# Patient Record
Sex: FEMALE | Race: WHITE | Employment: FULL TIME | URBAN - METROPOLITAN AREA
[De-identification: names, ages, dates, MRNs, and addresses within clinical notes are randomized per-mention and may not be internally consistent; named-entity substitution may affect disease eponyms.]

---

## 2019-06-06 ENCOUNTER — HOSPITAL ENCOUNTER (OUTPATIENT)
Dept: LAB | Age: 59
Discharge: HOME | End: 2019-06-06

## 2019-06-06 PROCEDURE — 86706 HEP B SURFACE ANTIBODY: CPT

## 2019-06-06 PROCEDURE — 86735 MUMPS ANTIBODY: CPT

## 2019-06-06 PROCEDURE — 86765 RUBEOLA ANTIBODY: CPT

## 2019-06-06 PROCEDURE — 86762 RUBELLA ANTIBODY: CPT

## 2019-06-06 PROCEDURE — 86787 VARICELLA-ZOSTER ANTIBODY: CPT

## 2019-06-07 LAB
HBV SURFACE AB SER QL IA: POSITIVE
HBV SURFACE AB SERPL IA-ACNC: 14.55 MIU/ML
HEP BS AB COMMENT,HBSAC: NORMAL
RUBV IGG SER-IMP: NORMAL

## 2019-06-08 LAB
MEV IGG SER IA-ACNC: >300 AU/ML
MUV IGG SER IA-ACNC: 245 AU/ML
VZV IGG SER IA-ACNC: 2251 INDEX

## 2020-01-27 ENCOUNTER — APPOINTMENT (OUTPATIENT)
Dept: CT IMAGING | Age: 60
End: 2020-01-27
Attending: NURSE PRACTITIONER
Payer: COMMERCIAL

## 2020-01-27 ENCOUNTER — APPOINTMENT (OUTPATIENT)
Dept: GENERAL RADIOLOGY | Age: 60
End: 2020-01-27
Attending: NURSE PRACTITIONER
Payer: COMMERCIAL

## 2020-01-27 ENCOUNTER — HOSPITAL ENCOUNTER (EMERGENCY)
Age: 60
Discharge: HOME | End: 2020-01-27
Attending: EMERGENCY MEDICINE
Payer: COMMERCIAL

## 2020-01-27 ENCOUNTER — NURSE TRIAGE (OUTPATIENT)
Dept: OTHER | Facility: CLINIC | Age: 60
End: 2020-01-27

## 2020-01-27 VITALS
HEIGHT: 64 IN | RESPIRATION RATE: 18 BRPM | DIASTOLIC BLOOD PRESSURE: 73 MMHG | TEMPERATURE: 97.9 F | WEIGHT: 157 LBS | BODY MASS INDEX: 26.8 KG/M2 | SYSTOLIC BLOOD PRESSURE: 104 MMHG | HEART RATE: 86 BPM | OXYGEN SATURATION: 98 %

## 2020-01-27 DIAGNOSIS — R42 LIGHTHEADEDNESS: ICD-10-CM

## 2020-01-27 DIAGNOSIS — R11.0 NAUSEA WITHOUT VOMITING: ICD-10-CM

## 2020-01-27 DIAGNOSIS — R10.13 ABDOMINAL PAIN, EPIGASTRIC: Primary | ICD-10-CM

## 2020-01-27 LAB
ALBUMIN SERPL-MCNC: 4 G/DL (ref 3.4–5)
ALBUMIN/GLOB SERPL: 1.1 {RATIO} (ref 0.8–1.7)
ALP SERPL-CCNC: 67 U/L (ref 45–117)
ALT SERPL-CCNC: 28 U/L (ref 13–56)
ANION GAP SERPL CALC-SCNC: 4 MMOL/L (ref 3–18)
APPEARANCE UR: CLEAR
AST SERPL-CCNC: 30 U/L (ref 10–38)
ATRIAL RATE: 58 BPM
ATRIAL RATE: 75 BPM
BACTERIA URNS QL MICRO: NEGATIVE /HPF
BASOPHILS # BLD: 0 K/UL (ref 0–0.1)
BASOPHILS NFR BLD: 1 % (ref 0–2)
BILIRUB SERPL-MCNC: 0.3 MG/DL (ref 0.2–1)
BILIRUB UR QL: NEGATIVE
BUN SERPL-MCNC: 22 MG/DL (ref 7–18)
BUN/CREAT SERPL: 19 (ref 12–20)
CALCIUM SERPL-MCNC: 9.1 MG/DL (ref 8.5–10.1)
CALCULATED P AXIS, ECG09: 45 DEGREES
CALCULATED P AXIS, ECG09: 47 DEGREES
CALCULATED R AXIS, ECG10: -2 DEGREES
CALCULATED R AXIS, ECG10: -6 DEGREES
CALCULATED T AXIS, ECG11: -13 DEGREES
CALCULATED T AXIS, ECG11: 54 DEGREES
CHLORIDE SERPL-SCNC: 108 MMOL/L (ref 100–111)
CK MB CFR SERPL CALC: 1.3 % (ref 0–4)
CK MB CFR SERPL CALC: 2 % (ref 0–4)
CK MB SERPL-MCNC: 1.2 NG/ML (ref 5–25)
CK MB SERPL-MCNC: 1.2 NG/ML (ref 5–25)
CK SERPL-CCNC: 59 U/L (ref 26–192)
CK SERPL-CCNC: 96 U/L (ref 26–192)
CO2 SERPL-SCNC: 27 MMOL/L (ref 21–32)
COLOR UR: YELLOW
CREAT SERPL-MCNC: 1.15 MG/DL (ref 0.6–1.3)
DIAGNOSIS, 93000: NORMAL
DIAGNOSIS, 93000: NORMAL
DIFFERENTIAL METHOD BLD: ABNORMAL
EOSINOPHIL # BLD: 0.3 K/UL (ref 0–0.4)
EOSINOPHIL NFR BLD: 4 % (ref 0–5)
EPITH CASTS URNS QL MICRO: NORMAL /LPF (ref 0–5)
ERYTHROCYTE [DISTWIDTH] IN BLOOD BY AUTOMATED COUNT: 14.6 % (ref 11.6–14.5)
GLOBULIN SER CALC-MCNC: 3.7 G/DL (ref 2–4)
GLUCOSE BLD STRIP.AUTO-MCNC: 155 MG/DL (ref 70–110)
GLUCOSE SERPL-MCNC: 179 MG/DL (ref 74–99)
GLUCOSE UR STRIP.AUTO-MCNC: >1000 MG/DL
HCT VFR BLD AUTO: 43.6 % (ref 35–45)
HGB BLD-MCNC: 13.9 G/DL (ref 12–16)
HGB UR QL STRIP: ABNORMAL
KETONES UR QL STRIP.AUTO: NEGATIVE MG/DL
LEUKOCYTE ESTERASE UR QL STRIP.AUTO: NEGATIVE
LIPASE SERPL-CCNC: 136 U/L (ref 73–393)
LYMPHOCYTES # BLD: 2 K/UL (ref 0.9–3.6)
LYMPHOCYTES NFR BLD: 32 % (ref 21–52)
MAGNESIUM SERPL-MCNC: 2 MG/DL (ref 1.6–2.6)
MCH RBC QN AUTO: 25.9 PG (ref 24–34)
MCHC RBC AUTO-ENTMCNC: 31.9 G/DL (ref 31–37)
MCV RBC AUTO: 81.2 FL (ref 74–97)
MONOCYTES # BLD: 0.4 K/UL (ref 0.05–1.2)
MONOCYTES NFR BLD: 7 % (ref 3–10)
NEUTS SEG # BLD: 3.6 K/UL (ref 1.8–8)
NEUTS SEG NFR BLD: 56 % (ref 40–73)
NITRITE UR QL STRIP.AUTO: NEGATIVE
P-R INTERVAL, ECG05: 120 MS
P-R INTERVAL, ECG05: 134 MS
PH UR STRIP: 5 [PH] (ref 5–8)
PLATELET # BLD AUTO: 411 K/UL (ref 135–420)
PMV BLD AUTO: 11 FL (ref 9.2–11.8)
POTASSIUM SERPL-SCNC: 4.7 MMOL/L (ref 3.5–5.5)
PROT SERPL-MCNC: 7.7 G/DL (ref 6.4–8.2)
PROT UR STRIP-MCNC: NEGATIVE MG/DL
Q-T INTERVAL, ECG07: 424 MS
Q-T INTERVAL, ECG07: 442 MS
QRS DURATION, ECG06: 78 MS
QRS DURATION, ECG06: 92 MS
QTC CALCULATION (BEZET), ECG08: 433 MS
QTC CALCULATION (BEZET), ECG08: 473 MS
RBC # BLD AUTO: 5.37 M/UL (ref 4.2–5.3)
RBC #/AREA URNS HPF: NEGATIVE /HPF (ref 0–5)
SODIUM SERPL-SCNC: 139 MMOL/L (ref 136–145)
SP GR UR REFRACTOMETRY: >1.03 (ref 1–1.03)
TROPONIN I SERPL-MCNC: <0.02 NG/ML (ref 0–0.04)
TROPONIN I SERPL-MCNC: <0.02 NG/ML (ref 0–0.04)
UROBILINOGEN UR QL STRIP.AUTO: 0.2 EU/DL (ref 0.2–1)
VENTRICULAR RATE, ECG03: 58 BPM
VENTRICULAR RATE, ECG03: 75 BPM
WBC # BLD AUTO: 6.4 K/UL (ref 4.6–13.2)
WBC URNS QL MICRO: NEGATIVE /HPF (ref 0–4)

## 2020-01-27 PROCEDURE — 83735 ASSAY OF MAGNESIUM: CPT

## 2020-01-27 PROCEDURE — 96374 THER/PROPH/DIAG INJ IV PUSH: CPT

## 2020-01-27 PROCEDURE — 74011636320 HC RX REV CODE- 636/320: Performed by: EMERGENCY MEDICINE

## 2020-01-27 PROCEDURE — 93005 ELECTROCARDIOGRAM TRACING: CPT

## 2020-01-27 PROCEDURE — 71045 X-RAY EXAM CHEST 1 VIEW: CPT

## 2020-01-27 PROCEDURE — 99285 EMERGENCY DEPT VISIT HI MDM: CPT

## 2020-01-27 PROCEDURE — 74011000250 HC RX REV CODE- 250: Performed by: NURSE PRACTITIONER

## 2020-01-27 PROCEDURE — 85025 COMPLETE CBC W/AUTO DIFF WBC: CPT

## 2020-01-27 PROCEDURE — 81001 URINALYSIS AUTO W/SCOPE: CPT

## 2020-01-27 PROCEDURE — 83690 ASSAY OF LIPASE: CPT

## 2020-01-27 PROCEDURE — 74011250636 HC RX REV CODE- 250/636: Performed by: NURSE PRACTITIONER

## 2020-01-27 PROCEDURE — 74011250636 HC RX REV CODE- 250/636: Performed by: EMERGENCY MEDICINE

## 2020-01-27 PROCEDURE — 96375 TX/PRO/DX INJ NEW DRUG ADDON: CPT

## 2020-01-27 PROCEDURE — 74177 CT ABD & PELVIS W/CONTRAST: CPT

## 2020-01-27 PROCEDURE — 82550 ASSAY OF CK (CPK): CPT

## 2020-01-27 PROCEDURE — 82962 GLUCOSE BLOOD TEST: CPT

## 2020-01-27 PROCEDURE — 80053 COMPREHEN METABOLIC PANEL: CPT

## 2020-01-27 PROCEDURE — 74011250637 HC RX REV CODE- 250/637: Performed by: NURSE PRACTITIONER

## 2020-01-27 PROCEDURE — 96376 TX/PRO/DX INJ SAME DRUG ADON: CPT

## 2020-01-27 RX ORDER — MORPHINE SULFATE 2 MG/ML
2 INJECTION, SOLUTION INTRAMUSCULAR; INTRAVENOUS
Status: DISCONTINUED | OUTPATIENT
Start: 2020-01-27 | End: 2020-01-27

## 2020-01-27 RX ORDER — ONDANSETRON 2 MG/ML
INJECTION INTRAMUSCULAR; INTRAVENOUS
Status: DISCONTINUED
Start: 2020-01-27 | End: 2020-01-27 | Stop reason: HOSPADM

## 2020-01-27 RX ORDER — ONDANSETRON 2 MG/ML
4 INJECTION INTRAMUSCULAR; INTRAVENOUS
Status: DISCONTINUED | OUTPATIENT
Start: 2020-01-27 | End: 2020-01-27

## 2020-01-27 RX ORDER — ONDANSETRON 2 MG/ML
4 INJECTION INTRAMUSCULAR; INTRAVENOUS
Status: COMPLETED | OUTPATIENT
Start: 2020-01-27 | End: 2020-01-27

## 2020-01-27 RX ORDER — ONDANSETRON 2 MG/ML
4 INJECTION INTRAMUSCULAR; INTRAVENOUS
Status: DISCONTINUED | OUTPATIENT
Start: 2020-01-27 | End: 2020-01-27 | Stop reason: SDUPTHER

## 2020-01-27 RX ORDER — MAG HYDROX/ALUMINUM HYD/SIMETH 200-200-20
30 SUSPENSION, ORAL (FINAL DOSE FORM) ORAL
Status: DISCONTINUED | OUTPATIENT
Start: 2020-01-27 | End: 2020-01-27 | Stop reason: HOSPADM

## 2020-01-27 RX ORDER — PROMETHAZINE HYDROCHLORIDE 25 MG/1
25 SUPPOSITORY RECTAL
Qty: 12 SUPPOSITORY | Refills: 0 | Status: SHIPPED | OUTPATIENT
Start: 2020-01-27 | End: 2020-02-03

## 2020-01-27 RX ORDER — MORPHINE SULFATE 4 MG/ML
4 INJECTION INTRAVENOUS
Status: COMPLETED | OUTPATIENT
Start: 2020-01-27 | End: 2020-01-27

## 2020-01-27 RX ORDER — LIDOCAINE HYDROCHLORIDE 20 MG/ML
15 SOLUTION OROPHARYNGEAL
Status: DISCONTINUED | OUTPATIENT
Start: 2020-01-27 | End: 2020-01-27 | Stop reason: HOSPADM

## 2020-01-27 RX ADMIN — MORPHINE SULFATE 4 MG: 4 INJECTION INTRAVENOUS at 10:28

## 2020-01-27 RX ADMIN — SODIUM CHLORIDE 1000 ML: 900 INJECTION, SOLUTION INTRAVENOUS at 06:59

## 2020-01-27 RX ADMIN — ALUMINUM HYDROXIDE, MAGNESIUM HYDROXIDE, AND SIMETHICONE 30 ML: 200; 200; 20 SUSPENSION ORAL at 12:49

## 2020-01-27 RX ADMIN — LIDOCAINE HYDROCHLORIDE 15 ML: 20 SOLUTION ORAL; TOPICAL at 12:49

## 2020-01-27 RX ADMIN — ONDANSETRON 4 MG: 2 INJECTION INTRAMUSCULAR; INTRAVENOUS at 10:32

## 2020-01-27 RX ADMIN — IOPAMIDOL 100 ML: 612 INJECTION, SOLUTION INTRAVENOUS at 08:36

## 2020-01-27 RX ADMIN — ONDANSETRON 4 MG: 2 INJECTION INTRAMUSCULAR; INTRAVENOUS at 06:56

## 2020-01-27 NOTE — ED PROVIDER NOTES
EMERGENCY DEPARTMENT HISTORY AND PHYSICAL EXAM    6:45 AM      Date: 1/27/2020  Patient Name: Lamar Dinero    History of Presenting Illness     Chief Complaint   Patient presents with    Dizziness    Nausea         History Provided By: Patient    Additional History (Context): Lamar Dinero is a 64 y.o. female with diabetes, hypertension and GERD, Gastroporesis, colitis who presents with complain of feeling like she was going to pass put while she was driving to work today. Pt reports she became lightheaded and diaphoretic. Pt took oral glucose because she thought she was hypoglycemic. Per pt she checked her BG and it was 132. Pt complaining of epigastric discomfort. Stated she was burping a lot this morning. Pt denies dizziness, headache, extremity weakness, visual disturbance. Pt stated she had similar episode a month ago and was told she had gastritis. Pt denies chest pain, shortness of breath, fevers, bodyaches, chills. PCP: Taisah Siegel MD    Current Facility-Administered Medications   Medication Dose Route Frequency Provider Last Rate Last Dose    alum-mag hydroxide-simeth (MYLANTA) oral suspension 30 mL  30 mL Oral Q4H PRN Cass Howell C, NP   30 mL at 01/27/20 1249    lidocaine (XYLOCAINE) 2 % viscous solution 15 mL  15 mL Mouth/Throat Q3H PRN Cass Howell C, NP   15 mL at 01/27/20 1249     Current Outpatient Medications   Medication Sig Dispense Refill    promethazine (PHENERGAN) 25 mg suppository Insert 1 Suppository into rectum every six (6) hours as needed for Nausea for up to 7 days. 12 Suppository 0       Past History     Past Medical History:  No past medical history on file. Past Surgical History:  No past surgical history on file. Family History:  No family history on file.     Social History:  Social History     Tobacco Use    Smoking status: Not on file   Substance Use Topics    Alcohol use: Not on file    Drug use: Not on file Allergies: Allergies   Allergen Reactions    Reglan [Metoclopramide Hcl] Other (comments)     Behavior problem    Sulfa (Sulfonamide Antibiotics) Hives         Review of Systems       Review of Systems   Constitutional: Negative for chills and fever. Respiratory: Negative for shortness of breath. Cardiovascular: Negative for chest pain. Gastrointestinal: Positive for abdominal pain. Negative for nausea and vomiting. Skin: Negative for rash. Neurological: Positive for light-headedness. Negative for weakness. All other systems reviewed and are negative. Physical Exam     Visit Vitals  /73 (BP 1 Location: Left arm, BP Patient Position: At rest)   Pulse 86   Temp 97.9 °F (36.6 °C)   Resp 18   Ht 5' 4\" (1.626 m)   Wt 71.2 kg (157 lb)   SpO2 98%   BMI 26.95 kg/m²         Physical Exam  Vitals signs reviewed. Constitutional:       General: She is not in acute distress. Appearance: Normal appearance. She is well-developed. She is not ill-appearing or toxic-appearing. HENT:      Head: Normocephalic and atraumatic. Eyes:      Extraocular Movements: Extraocular movements intact. Right eye: Normal extraocular motion and no nystagmus. Left eye: Normal extraocular motion and no nystagmus. Conjunctiva/sclera: Conjunctivae normal.      Pupils: Pupils are equal, round, and reactive to light. Neck:      Musculoskeletal: Normal range of motion. Trachea: Trachea normal.   Cardiovascular:      Rate and Rhythm: Normal rate and regular rhythm. Pulmonary:      Effort: Pulmonary effort is normal.      Breath sounds: Normal breath sounds. Abdominal:      General: Bowel sounds are normal. There is no distension or abdominal bruit. Palpations: Abdomen is soft. Abdomen is not rigid. There is no shifting dullness, fluid wave, mass or pulsatile mass. Tenderness: There is tenderness in the epigastric area.  There is no right CVA tenderness, left CVA tenderness or guarding. Negative signs include Davis's sign, Rovsing's sign and McBurney's sign. Neurological:      General: No focal deficit present. Mental Status: She is alert and oriented to person, place, and time. Cranial Nerves: Cranial nerves are intact. Sensory: Sensation is intact. Motor: Motor function is intact. Coordination: Coordination is intact. Gait: Gait is intact. Comments: NIH 0           Diagnostic Study Results     Labs -  Recent Results (from the past 12 hour(s))   GLUCOSE, POC    Collection Time: 01/27/20  6:18 AM   Result Value Ref Range    Glucose (POC) 155 (H) 70 - 110 mg/dL   EKG, 12 LEAD, INITIAL    Collection Time: 01/27/20  6:26 AM   Result Value Ref Range    Ventricular Rate 58 BPM    Atrial Rate 58 BPM    P-R Interval 120 ms    QRS Duration 78 ms    Q-T Interval 442 ms    QTC Calculation (Bezet) 433 ms    Calculated P Axis 47 degrees    Calculated R Axis -2 degrees    Calculated T Axis -13 degrees    Diagnosis       Sinus bradycardia  Nonspecific ST and T wave abnormality  Abnormal ECG  No previous ECGs available     CBC WITH AUTOMATED DIFF    Collection Time: 01/27/20  6:45 AM   Result Value Ref Range    WBC 6.4 4.6 - 13.2 K/uL    RBC 5.37 (H) 4.20 - 5.30 M/uL    HGB 13.9 12.0 - 16.0 g/dL    HCT 43.6 35.0 - 45.0 %    MCV 81.2 74.0 - 97.0 FL    MCH 25.9 24.0 - 34.0 PG    MCHC 31.9 31.0 - 37.0 g/dL    RDW 14.6 (H) 11.6 - 14.5 %    PLATELET 226 370 - 063 K/uL    MPV 11.0 9.2 - 11.8 FL    NEUTROPHILS 56 40 - 73 %    LYMPHOCYTES 32 21 - 52 %    MONOCYTES 7 3 - 10 %    EOSINOPHILS 4 0 - 5 %    BASOPHILS 1 0 - 2 %    ABS. NEUTROPHILS 3.6 1.8 - 8.0 K/UL    ABS. LYMPHOCYTES 2.0 0.9 - 3.6 K/UL    ABS. MONOCYTES 0.4 0.05 - 1.2 K/UL    ABS. EOSINOPHILS 0.3 0.0 - 0.4 K/UL    ABS.  BASOPHILS 0.0 0.0 - 0.1 K/UL    DF AUTOMATED     METABOLIC PANEL, COMPREHENSIVE    Collection Time: 01/27/20  6:45 AM   Result Value Ref Range    Sodium 139 136 - 145 mmol/L    Potassium 4.7 3.5 - 5.5 mmol/L    Chloride 108 100 - 111 mmol/L    CO2 27 21 - 32 mmol/L    Anion gap 4 3.0 - 18 mmol/L    Glucose 179 (H) 74 - 99 mg/dL    BUN 22 (H) 7.0 - 18 MG/DL    Creatinine 1.15 0.6 - 1.3 MG/DL    BUN/Creatinine ratio 19 12 - 20      GFR est AA 59 (L) >60 ml/min/1.73m2    GFR est non-AA 49 (L) >60 ml/min/1.73m2    Calcium 9.1 8.5 - 10.1 MG/DL    Bilirubin, total 0.3 0.2 - 1.0 MG/DL    ALT (SGPT) 28 13 - 56 U/L    AST (SGOT) 30 10 - 38 U/L    Alk.  phosphatase 67 45 - 117 U/L    Protein, total 7.7 6.4 - 8.2 g/dL    Albumin 4.0 3.4 - 5.0 g/dL    Globulin 3.7 2.0 - 4.0 g/dL    A-G Ratio 1.1 0.8 - 1.7     LIPASE    Collection Time: 01/27/20  6:45 AM   Result Value Ref Range    Lipase 136 73 - 393 U/L   CARDIAC PANEL,(CK, CKMB & TROPONIN)    Collection Time: 01/27/20  6:45 AM   Result Value Ref Range    CK 96 26 - 192 U/L    CK - MB 1.2 <3.6 ng/ml    CK-MB Index 1.3 0.0 - 4.0 %    Troponin-I, QT <0.02 0.0 - 0.045 NG/ML   MAGNESIUM    Collection Time: 01/27/20  6:45 AM   Result Value Ref Range    Magnesium 2.0 1.6 - 2.6 mg/dL   EKG, 12 LEAD, SUBSEQUENT    Collection Time: 01/27/20 10:54 AM   Result Value Ref Range    Ventricular Rate 75 BPM    Atrial Rate 75 BPM    P-R Interval 134 ms    QRS Duration 92 ms    Q-T Interval 424 ms    QTC Calculation (Bezet) 473 ms    Calculated P Axis 45 degrees    Calculated R Axis -6 degrees    Calculated T Axis 54 degrees    Diagnosis       Normal sinus rhythm  Possible Left atrial enlargement  Nonspecific ST abnormality  Abnormal ECG  When compared with ECG of 27-JAN-2020 06:26,  T wave inversion no longer evident in Inferior leads  Nonspecific T wave abnormality no longer evident in Anterior leads     CARDIAC PANEL,(CK, CKMB & TROPONIN)    Collection Time: 01/27/20 11:03 AM   Result Value Ref Range    CK 59 26 - 192 U/L    CK - MB 1.2 <3.6 ng/ml    CK-MB Index 2.0 0.0 - 4.0 %    Troponin-I, QT <0.02 0.0 - 0.045 NG/ML   URINALYSIS W/ RFLX MICROSCOPIC    Collection Time: 01/27/20 11:30 AM   Result Value Ref Range    Color YELLOW      Appearance CLEAR      Specific gravity >1.030 (H) 1.005 - 1.030    pH (UA) 5.0 5.0 - 8.0      Protein NEGATIVE  NEG mg/dL    Glucose >1,000 (A) NEG mg/dL    Ketone NEGATIVE  NEG mg/dL    Bilirubin NEGATIVE  NEG      Blood TRACE (A) NEG      Urobilinogen 0.2 0.2 - 1.0 EU/dL    Nitrites NEGATIVE  NEG      Leukocyte Esterase NEGATIVE  NEG     URINE MICROSCOPIC ONLY    Collection Time: 01/27/20 11:30 AM   Result Value Ref Range    WBC NEGATIVE  0 - 4 /hpf    RBC NEGATIVE  0 - 5 /hpf    Epithelial cells FEW 0 - 5 /lpf    Bacteria NEGATIVE  NEG /hpf       Radiologic Studies -   CT ABD PELV W CONT   Final Result   IMPRESSION:      No acute findings to explain patient's symptoms. Mild diverticulosis. Nonobstructing small left nephrolithiasis. XR CHEST PORT   Final Result   IMPRESSION:      Negative study. Medical Decision Making   I am the first provider for this patient. I reviewed the vital signs, available nursing notes, past medical history, past surgical history, family history and social history. Vital Signs-Reviewed the patient's vital signs. Records Reviewed: Nursing Notes and Old Medical Records (Time of Review: 6:45 AM)    ED Course: Progress Notes, Reevaluation, and Consults:  10:29 AM Pt dry heaving, became diaphoretic after experiencing sharp epigastric pain. 11:26 AM Pt in no distress, reports the pain is better, no longer dry heaving. 1200 PM Second repeat cardiac panel normal.   12:05 PM Attempted to consult patient's  GI doctor,  Dr. Linda Hicks, they are on lunch was told to call after 1pm.  12:30 Spoke to GI nurse, she will make follow up appointment for patient. 1240 explained to pt plan of care and agreed. Pt given strict return precautions. Pt in no distress prior discharge stated she felt a lot better and was in no pain. Pt ambulatory to and from restroom without difficulty.       Provider Notes (Medical Decision Making):   Patient in no distress during exam. BG on arrival 155. Not experiencing any shortness of breath, chest pain. Vital signs stable. Patient alert oriented and able to answer questions. Pt does have history of same episodes in the past and does not know why. No neurological deficits. Epigastric tenderness on palpation. Will rule out surgical abdomen and cardiac etiology. Pt neurologically intact, had no deficits on neuro exam. I do not suspect stroke. Heart Score 3 : Low Score (0-3 points) Risk of MACE of 0.9-1.7%. Initial cardiac panel negative. Labs stable. Urine normal. EKG was normal. No signs of MI. CT of abdomen results showed no acute findings. Pt is non toxic looking. Dr. Raleigh Colon was available for consultation and assessment of patient. Agrees with plan of care. Diagnosis     Clinical Impression:   1. Abdominal pain, epigastric    2. Nausea without vomiting    3. Lightheadedness        Disposition: home     Follow-up Information     Follow up With Specialties Details Why Contact Info    Roenker, Delfino Hashimoto, MD [facility] In 1 week  [address] [facility]  [place] [address]  [phone]      [facility] EMERGENCY DEPT Emergency Medicine  If symptoms worsen [address] [facility]  [phone]    Edgardo Martinez MD Gastroenterology Schedule an appointment as soon as possible for a visit in 1 day  [address] [place] [address]  [phone]             Discharge Medication List as of 1/27/2020 12:51 PM      START taking these medications    Details   promethazine (PHENERGAN) 25 mg suppository Insert 1 Suppository into rectum every six (6) hours as needed for Nausea for up to 7 days. , Normal, Disp-12 Suppository, R-0             Dictation disclaimer:  Please note that this dictation was completed with [employer], the [employer] voice recognition software.   Quite often unanticipated grammatical, syntax, homophones, and other interpretive errors are inadvertently transcribed by the computer software. Please disregard these errors. Please excuse any errors that have escaped final proofreading.

## 2020-01-27 NOTE — TELEPHONE ENCOUNTER
Reason for Disposition   Caller has already spoken with another triager and has no further questions    Protocols used: NO CONTACT OR DUPLICATE CONTACT CALL-ADULT-[place]    Employee calling to let RN Access know she is currently in ED to be seen. She was on her way to work and developed chest pain and sob so she went to the ED. She has already been triaged in the ED, waiting for provider. Informed her will make note of her call.

## 2020-01-27 NOTE — LETTER
[address] [place] [address] Dr [facility] EMERGENCY DEPT 
[address] [facility] [id number] [phone] Work/School Note Date: 1/27/2020 To Whom It May concern: 
 
Erik Olea was seen and treated today in the emergency room by the following provider(s): 
Attending Provider: Dilcia Jeronimo DO 
Nurse Practitioner: Aaliyah Licona NP. Erik Olea may return to work on 01/31/2020 Sincerely, Fredis Greene NP

## 2020-01-27 NOTE — ED TRIAGE NOTES
Pt arrived via car to ED. 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 She c/o feeling faint, nausea on way to work. Feeling like she is going to pass out. BG checked 155mg/dL. To EKG room.

## 2020-01-27 NOTE — DISCHARGE INSTRUCTIONS
Patient Education        Abdominal Pain: Care Instructions  Your Care Instructions    Abdominal pain has many possible causes. Some aren't serious and get better on their own in a few days. Others need more testing and treatment. If your pain continues or gets worse, you need to be rechecked and may need more tests to find out what is wrong. You may need surgery to correct the problem. Don't ignore new symptoms, such as fever, nausea and vomiting, urination problems, pain that gets worse, and dizziness. These may be signs of a more serious problem. Your doctor may have recommended a follow-up visit in the next 8 to 12 hours. If you are not getting better, you may need more tests or treatment. The doctor has checked you carefully, but problems can develop later. If you notice any problems or new symptoms, get medical treatment right away. Follow-up care is a key part of your treatment and safety. Be sure to make and go to all appointments, and call your doctor if you are having problems. It's also a good idea to know your test results and keep a list of the medicines you take. How can you care for yourself at home? · Rest until you feel better. · To prevent dehydration, drink plenty of fluids, enough so that your urine is light yellow or clear like water. Choose water and other caffeine-free clear liquids until you feel better. If you have kidney, heart, or liver disease and have to limit fluids, talk with your doctor before you increase the amount of fluids you drink. · If your stomach is upset, eat mild foods, such as rice, dry toast or crackers, bananas, and applesauce. Try eating several small meals instead of two or three large ones. · Wait until 48 hours after all symptoms have gone away before you have spicy foods, alcohol, and drinks that contain caffeine. · Do not eat foods that are high in fat. · Avoid anti-inflammatory medicines such as aspirin, ibuprofen (Advil, Motrin), and naproxen (Aleve). These can cause stomach upset. Talk to your doctor if you take daily aspirin for another health problem. When should you call for help? Call 911 anytime you think you may need emergency care. For example, call if:    · You passed out (lost consciousness).     · You pass maroon or very bloody stools.     · You vomit blood or what looks like coffee grounds.     · You have new, severe belly pain.    Call your doctor now or seek immediate medical care if:    · Your pain gets worse, especially if it becomes focused in one area of your belly.     · You have a new or higher fever.     · Your stools are black and look like tar, or they have streaks of blood.     · You have unexpected vaginal bleeding.     · You have symptoms of a urinary tract infection. These may include:  ? Pain when you urinate. ? Urinating more often than usual.  ? Blood in your urine.     · You are dizzy or lightheaded, or you feel like you may faint.    Watch closely for changes in your health, and be sure to contact your doctor if:    · You are not getting better after 1 day (24 hours). Where can you learn more? Go to http://tavia"[employer]"michelle.info/. Enter S054 in the search box to learn more about \"Abdominal Pain: Care Instructions. \"  Current as of: June 26, 2019  Content Version: 12.2  © 5403-7621 [employer]. Care instructions adapted under license by [employer] (which disclaims liability or warranty for this information). If you have questions about a medical condition or this instruction, always ask your healthcare professional. [place] [address] any warranty or liability for your use of this information. Patient Education        Indigestion (Dyspepsia or Heartburn): Care Instructions  Your Care Instructions  Sometimes it can be hard to pinpoint the cause of indigestion.  (It is also called dyspepsia or heartburn.) Most cases of an upset stomach with bloating, burning, burping, and nausea are minor and go away within several hours. Home treatment and over-the-counter medicine often are able to control symptoms. But if you take medicine to relieve your indigestion without making diet and lifestyle changes, your symptoms are likely to return again and again. If you get indigestion often, it may be a sign of a more serious medical problem. Be sure to follow up with your doctor, who may want to do tests to be sure of the cause of your indigestion. Follow-up care is a key part of your treatment and safety. Be sure to make and go to all appointments, and call your doctor if you are having problems. It's also a good idea to know your test results and keep a list of the medicines you take. How can you care for yourself at home? · Your doctor may recommend over-the-counter medicine. For mild or occasional indigestion, antacids such as Gaviscon, Mylanta, Maalox, or Tums, may help. Be safe with medicines. Be careful when you take over-the-counter antacid medicines. Many of these medicines have aspirin in them. Read the label to make sure that you are not taking more than the recommended dose. Too much aspirin can be harmful. · Your doctor also may recommend over-the-counter acid reducers, such as Pepcid AC, Tagamet HB, Zantac 75, or Prilosec. Read and follow all instructions on the label. If you use these medicines often, talk with your doctor. · Change your eating habits. ? It's best to eat several small meals instead of two or three large meals. ? After you eat, wait 2 to 3 hours before you lie down. ? Chocolate, mint, and alcohol can make GERD worse. ? Spicy foods, foods that have a lot of acid (like tomatoes and oranges), and coffee can make GERD symptoms worse in some people. If your symptoms are worse after you eat a certain food, you may want to stop eating that food to see if your symptoms get better. · Do not smoke or chew tobacco. Smoking can make GERD worse.  If you need help quitting, talk to your doctor about stop-smoking programs and medicines. These can increase your chances of quitting for good. · If you have GERD symptoms at night, raise the head of your bed 6 to 8 inches. You can do this by putting the frame on blocks or placing a foam wedge under the head of your mattress. (Adding extra pillows does not work.)  · Do not wear tight clothing around your middle. · Lose weight if you need to. Losing just 5 to 10 pounds can help. · Do not take anti-inflammatory medicines, such as aspirin, ibuprofen (Advil, Motrin), or naproxen (Aleve). These can irritate the stomach. If you need a pain medicine, try acetaminophen (Tylenol), which does not cause stomach upset. When should you call for help? Call your doctor now or seek immediate medical care if:    · You have new or worse belly pain.     · You are vomiting.    Watch closely for changes in your health, and be sure to contact your doctor if:    · You have new or worse symptoms of indigestion.     · You have trouble or pain swallowing.     · You are losing weight.     · You do not get better as expected. Where can you learn more? Go to http://tavia-michelle.info/. Enter K898 in the search box to learn more about \"Indigestion (Dyspepsia or Heartburn): Care Instructions. \"  Current as of: November 7, 2018  Content Version: 12.2  © 1190-5523 [employer]. Care instructions adapted under license by [employer] (which disclaims liability or warranty for this information). If you have questions about a medical condition or this instruction, always ask your healthcare professional. [place] [address] any warranty or liability for your use of this information. Patient Education        Lightheadedness or Faintness: Care Instructions  Your Care Instructions  Lightheadedness is a feeling that you are about to faint or \"pass out. \" You do not feel as if you or your surroundings are moving. It is different from vertigo, which is the feeling that you or things around you are spinning or tilting. Lightheadedness usually goes away or gets better when you lie down. If lightheadedness gets worse, it can lead to a fainting spell. It is common to feel lightheaded from time to time. Lightheadedness usually is not caused by a serious problem. It often is caused by a short-lasting drop in blood pressure and blood flow to your head that occurs when you get up too quickly from a seated or lying position. Follow-up care is a key part of your treatment and safety. Be sure to make and go to all appointments, and call your doctor if you are having problems. It's also a good idea to know your test results and keep a list of the medicines you take. How can you care for yourself at home? · Lie down for 1 or 2 minutes when you feel lightheaded. After lying down, sit up slowly and remain sitting for 1 to 2 minutes before slowly standing up. · Avoid movements, positions, or activities that have made you lightheaded in the past.  · Get plenty of rest, especially if you have a cold or flu, which can cause lightheadedness. · Make sure you drink plenty of fluids, especially if you have a fever or have been sweating. · Do not drive or put yourself and others in danger while you feel lightheaded. When should you call for help? Call 911 anytime you think you may need emergency care. For example, call if:    · You have symptoms of a stroke. These may include:  ? Sudden numbness, tingling, weakness, or loss of movement in your face, arm, or leg, especially on only one side of your body. ? Sudden vision changes. ? Sudden trouble speaking. ? Sudden confusion or trouble understanding simple statements. ? Sudden problems with walking or balance. ? A sudden, severe headache that is different from past headaches.     · You have symptoms of a heart attack. These may include:  ?  Chest pain or pressure, or a strange feeling in the chest.  ? Sweating. ? Shortness of breath. ? Nausea or vomiting. ? Pain, pressure, or a strange feeling in the back, neck, jaw, or upper belly or in one or both shoulders or arms. ? Lightheadedness or sudden weakness. ? A fast or irregular heartbeat. After you call 911, the  may tell you to chew 1 adult-strength or 2 to 4 low-dose aspirin. Wait for an ambulance. Do not try to drive yourself.    Watch closely for changes in your health, and be sure to contact your doctor if:    · Your lightheadedness gets worse or does not get better with home care. Where can you learn more? Go to http://tavia-michelle.info/. Enter T379 in the search box to learn more about \"Lightheadedness or Faintness: Care Instructions. \"  Current as of: June 26, 2019  Content Version: 12.2  © 3344-4713 [employer]. Care instructions adapted under license by [employer] (which disclaims liability or warranty for this information). If you have questions about a medical condition or this instruction, always ask your healthcare professional. [place] [address] any warranty or liability for your use of this information. Patient Education        Nausea and Vomiting: Care Instructions  Your Care Instructions    When you are nauseated, you may feel weak and sweaty and notice a lot of saliva in your mouth. Nausea often leads to vomiting. Most of the time you do not need to worry about nausea and vomiting, but they can be signs of other illnesses. Two common causes of nausea and vomiting are stomach flu and food poisoning. Nausea and vomiting from viral stomach flu will usually start to improve within 24 hours. Nausea and vomiting from food poisoning may last from 12 to 48 hours. The doctor has checked you carefully, but problems can develop later. If you notice any problems or new symptoms, get medical treatment right away.   Follow-up care is a key part of your treatment and safety. Be sure to make and go to all appointments, and call your doctor if you are having problems. It's also a good idea to know your test results and keep a list of the medicines you take. How can you care for yourself at home? · To prevent dehydration, drink plenty of fluids, enough so that your urine is light yellow or clear like water. Choose water and other caffeine-free clear liquids until you feel better. If you have kidney, heart, or liver disease and have to limit fluids, talk with your doctor before you increase the amount of fluids you drink. · Rest in bed until you feel better. · When you are able to eat, try clear soups, mild foods, and liquids until all symptoms are gone for 12 to 48 hours. Other good choices include dry toast, crackers, cooked cereal, and gelatin dessert, such as Jell-O. When should you call for help? Call 911 anytime you think you may need emergency care. For example, call if:    · You passed out (lost consciousness).    Call your doctor now or seek immediate medical care if:    · You have symptoms of dehydration, such as:  ? Dry eyes and a dry mouth. ? Passing only a little dark urine. ? Feeling thirstier than usual.     · You have new or worsening belly pain.     · You have a new or higher fever.     · You vomit blood or what looks like coffee grounds.    Watch closely for changes in your health, and be sure to contact your doctor if:    · You have ongoing nausea and vomiting.     · Your vomiting is getting worse.     · Your vomiting lasts longer than 2 days.     · You are not getting better as expected. Where can you learn more? Go to http://tavia-michelle.info/. Enter 25 408260 in the search box to learn more about \"Nausea and Vomiting: Care Instructions. \"  Current as of: June 26, 2019  Content Version: 12.2  © 4954-8624 [employer].  Care instructions adapted under license by [employer] (which disclaims liability or warranty for this information). If you have questions about a medical condition or this instruction, always ask your healthcare professional. [address]  any warranty or liability for your use of this information. Follow up with GI. Return to ED if any worsening symptoms. Use antiemetics as needed. Stay hydrated.

## 2020-10-21 ENCOUNTER — OFFICE VISIT (OUTPATIENT)
Dept: ORTHOPEDIC SURGERY | Age: 60
End: 2020-10-21
Payer: COMMERCIAL

## 2020-10-21 VITALS
BODY MASS INDEX: 27.83 KG/M2 | HEIGHT: 64 IN | SYSTOLIC BLOOD PRESSURE: 145 MMHG | HEART RATE: 83 BPM | DIASTOLIC BLOOD PRESSURE: 70 MMHG | OXYGEN SATURATION: 98 % | WEIGHT: 163 LBS | TEMPERATURE: 95.3 F | RESPIRATION RATE: 16 BRPM

## 2020-10-21 DIAGNOSIS — M15.1 DEGENERATIVE ARTHRITIS OF DISTAL INTERPHALANGEAL JOINT OF MIDDLE FINGER OF LEFT HAND: ICD-10-CM

## 2020-10-21 DIAGNOSIS — M65.332 TRIGGER MIDDLE FINGER OF LEFT HAND: Primary | ICD-10-CM

## 2020-10-21 DIAGNOSIS — M65.331 TRIGGER MIDDLE FINGER OF RIGHT HAND: ICD-10-CM

## 2020-10-21 DIAGNOSIS — M15.1 DEGENERATIVE ARTHRITIS OF DISTAL INTERPHALANGEAL JOINT OF INDEX FINGER OF RIGHT HAND: ICD-10-CM

## 2020-10-21 DIAGNOSIS — M67.441 DIGITAL MUCOUS CYST OF FINGER OF RIGHT HAND: ICD-10-CM

## 2020-10-21 DIAGNOSIS — M67.442 DIGITAL MUCOUS CYST OF FINGER OF LEFT HAND: ICD-10-CM

## 2020-10-21 PROCEDURE — 73140 X-RAY EXAM OF FINGER(S): CPT | Performed by: ORTHOPAEDIC SURGERY

## 2020-10-21 PROCEDURE — 20550 NJX 1 TENDON SHEATH/LIGAMENT: CPT | Performed by: ORTHOPAEDIC SURGERY

## 2020-10-21 PROCEDURE — 99204 OFFICE O/P NEW MOD 45 MIN: CPT | Performed by: ORTHOPAEDIC SURGERY

## 2020-10-21 RX ORDER — GABAPENTIN 800 MG/1
600 TABLET ORAL
COMMUNITY
End: 2020-12-21

## 2020-10-21 RX ORDER — MELOXICAM 7.5 MG/1
TABLET ORAL 2 TIMES DAILY
COMMUNITY
End: 2020-12-21 | Stop reason: ALTCHOICE

## 2020-10-21 RX ORDER — SIMVASTATIN 40 MG/1
TABLET, FILM COATED ORAL
COMMUNITY
Start: 2019-02-28

## 2020-10-21 RX ORDER — FENOFIBRIC ACID 45 MG/1
CAPSULE, DELAYED RELEASE ORAL DAILY
COMMUNITY

## 2020-10-21 RX ORDER — ROSUVASTATIN CALCIUM 10 MG/1
10 TABLET, COATED ORAL
COMMUNITY

## 2020-10-21 RX ORDER — COLESEVELAM 180 1/1
2000 TABLET ORAL
COMMUNITY

## 2020-10-21 RX ORDER — METOPROLOL SUCCINATE 50 MG/1
25 TABLET, EXTENDED RELEASE ORAL DAILY
COMMUNITY

## 2020-10-21 RX ORDER — PANTOPRAZOLE SODIUM 40 MG/1
40 TABLET, DELAYED RELEASE ORAL 2 TIMES DAILY
COMMUNITY

## 2020-10-21 RX ORDER — DULOXETIN HYDROCHLORIDE 60 MG/1
60 CAPSULE, DELAYED RELEASE ORAL 2 TIMES DAILY
COMMUNITY

## 2020-10-21 NOTE — PROGRESS NOTES
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  Wanda Coleman and legal considerations: none filed. Vitals:    10/21/20 0926   BP: (!) 145/70   Pulse: 83   Resp: 16   Temp: (!) 95.3 °F (35.2 °C)   TempSrc: Temporal   SpO2: 98%   Weight: 163 lb (73.9 kg)   Height: 5' 4\" (1.626 m)   PainSc:   6   PainLoc: Finger           Chief Complaint   Patient presents with    Hand Pain     swapnil hand pain         HPI: Patient comes in today with complaints of bilateral middle finger pain and locking as well as bumps on her right index and left middle fingers. She reports draining the left middle finger mucous cyst.    Date of onset: Chronic    Injury: No    Prior Treatment:  Yes: Comment: Bilateral middle finger A1 pulley injections by another provider    Numbness/ Tingling: No      ROS: Review of Systems - General ROS: negative  Psychological ROS: negative  ENT ROS: negative  Allergy and Immunology ROS: negative  Hematological and Lymphatic ROS: negative  Respiratory ROS: no cough, shortness of breath, or wheezing  Cardiovascular ROS: no chest pain or dyspnea on exertion  Gastrointestinal ROS: no abdominal pain, change in bowel habits, or black or bloody stools  Musculoskeletal ROS: positive for - pain in hand - bilateral  Neurological ROS: negative  Dermatological ROS: negative    Past Medical History:   Diagnosis Date    Hypertension        History reviewed. No pertinent surgical history. Current Outpatient Medications   Medication Sig Dispense Refill    metoprolol succinate (TOPROL-XL) 50 mg XL tablet Take  by mouth daily.  simvastatin (ZOCOR) 40 mg tablet TAKE 1 TABLET BY MOUTH EVERY DAY      fenofibric acid (TRILIPIX ER) 45 mg capsule Take  by mouth daily.  rosuvastatin (Crestor) 10 mg tablet Take 10 mg by mouth nightly.  colesevelam (WELCHOL) 625 mg tablet Take 1,875 mg by mouth two (2) times daily (with meals).       DULoxetine (CYMBALTA) 60 mg capsule Take 60 mg by mouth daily.  gabapentin (NEURONTIN) 800 mg tablet Take  by mouth three (3) times daily.  meloxicam (MOBIC) 7.5 mg tablet Take  by mouth daily.  pantoprazole (PROTONIX) 40 mg tablet Take 40 mg by mouth daily.  insulin lispro protamine/insulin lispro (HumaLOG Mix 50-50 Insuln U-100) 100 unit/mL (50-50) injection by SubCUTAneous route.  triamcinolone acetonide (KENALOG) 10 mg/mL injection 1 mL by Intra artICUlar route once for 1 dose. 1 Vial 0       Allergies   Allergen Reactions    Reglan [Metoclopramide Hcl] Other (comments)     Behavior problem    Sulfa (Sulfonamide Antibiotics) Hives           PE:     Physical Exam  Vitals signs and nursing note reviewed. Constitutional:       General: She is not in acute distress. Appearance: Normal appearance. She is not ill-appearing, toxic-appearing or diaphoretic. HENT:      Head: Normocephalic and atraumatic. Mouth/Throat:      Mouth: Mucous membranes are moist.   Eyes:      Extraocular Movements: Extraocular movements intact. Pupils: Pupils are equal, round, and reactive to light. Neck:      Musculoskeletal: Normal range of motion and neck supple. Cardiovascular:      Pulses: Normal pulses. Pulmonary:      Effort: Pulmonary effort is normal. No respiratory distress. Abdominal:      General: Abdomen is flat. There is no distension. Musculoskeletal: Normal range of motion. General: Swelling and tenderness present. No deformity or signs of injury. Right lower leg: No edema. Left lower leg: No edema. Skin:     General: Skin is warm and dry. Capillary Refill: Capillary refill takes less than 2 seconds. Findings: No bruising or erythema. Neurological:      General: No focal deficit present. Mental Status: She is alert and oriented to person, place, and time.    Psychiatric:         Mood and Affect: Mood normal.         Behavior: Behavior normal.            Hand: There are mucous cysts noted on the right index finger and left middle finger. There is a scab over the left middle finger DIP joint secondary to previous drainage. Examination L Digit(s) R Digit(s)   1st [facility] Tenderness -  -    1st [facility] Grind -  -    Janet Nodes -  -    Heberden Nodes +  +    A1 Pulley Tenderness + LF + LF   Triggering + LF + LF   UCL Instability -  -    RCL Instability -  -    Lateral Stress Pain -  -    Palmar Cords -  -    Tabletop test -  -    Garrod's Pads -  -     Strength       Pinch Strength         ROM: Full        Imaging:     10/21/2020 plain films of left middle finger shows degenerative changes at the DIP joint. ICD-10-CM ICD-9-CM    1. Trigger middle finger of left hand  M65.332 727.03 SCHEDULE SURGERY   2. Digital mucous cyst of finger of left hand  M67.442 727.43 AMB POC XRAY, FINGER(S), 2+ VIEWS      SCHEDULE SURGERY   3. 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 Trigger middle finger of right hand  M65.331 727.03 TRIAMCINOLONE ACETONIDE INJ      triamcinolone acetonide (KENALOG) 10 mg/mL injection      INJECT TENDON SHEATH/LIGAMENT   5. Digital mucous cyst of finger of right hand  M67.441 727.43    6. 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 Right middle finger A1 pulley injection    This procedure has been fully reviewed with the patient and written informed consent has been obtained. The patient was counseled at length about the risks of yari Covid-19 during their perioperative period and any recovery window from their procedure.   The patient was made aware that yari Covid-19  may worsen their prognosis for recovering from their procedure and lend to a higher morbidity and/or mortality risk. All material risks, benefits, and reasonable alternatives including postponing the procedure were discussed. The patient does  wish to proceed with the procedure at this time. Follow-up and Dispositions    · Return for 2 weeks postop. Plan was reviewed with patient, who verbalized agreement and understanding of the plan    [address] NOTE        Chart reviewed for the following:   Kuldeep WONG DO, have reviewed the History, Physical and updated the Allergic reactions for [facility] performed immediately prior to start of procedure:   Kuldeep WONG DO, have performed the following reviews on [facility] prior to the start of the procedure:            * Patient was identified by name and date of birth   * Agreement on procedure being performed was verified  * Risks and Benefits explained to the patient  * Procedure site verified and marked as necessary  * Patient was positioned for comfort  * Consent was signed and verified     Time: 10:14 AM      Date of procedure: 10/21/2020    Procedure performed by: Henrry Meadows DO    Provider assisted by: Héctor Ibarra LPN    Patient assisted by: self    How tolerated by patient: tolerated the procedure well with no complications    Post Procedural Pain Scale: 0 - No Hurt    Comments: none    Procedure:  After consent was obtained, using sterile technique the tendon was prepped. Local anesthetic used: 1% lidocaine. Kenalog 5 mg and was then injected and the needle withdrawn. The procedure was well tolerated. The patient is asked to continue to rest the area for a few more days before resuming regular activities. It may be more painful for the first 1-2 days. Watch for fever, or increased swelling or persistent pain in the joint.  Call or return to clinic prn if such symptoms occur or there is failure to improve as anticipated.

## 2020-10-29 ENCOUNTER — PATIENT MESSAGE (OUTPATIENT)
Dept: ORTHOPEDIC SURGERY | Age: 60
End: 2020-10-29

## 2020-10-30 NOTE — TELEPHONE ENCOUNTER
From: Juice Dyer  To: Gerald Mendenhall DO  Sent: 10/29/2020 1:57 PM EDT  Subject: Non-Urgent Medical Question    Good Afternoon,     Spoke to Carlee Vallecillo yesterday and she stated my surgery was book for Nov 17. All she said was if I dont call you back your all set. I was wondering if someone is going to call me in reguards to pre op orders. Do I need to have a corona virus done? ETC. Thank you . Radha Mayberry

## 2020-10-30 NOTE — TELEPHONE ENCOUNTER
Spoke with this patient. Gave basic instructions for surgery, ie. : have labs/EKG completed next week, surgery instruction letter will be mailed early next week. Patient voiced her understanding of these instructions.

## 2020-11-02 ENCOUNTER — DOCUMENTATION ONLY (OUTPATIENT)
Dept: ORTHOPEDIC SURGERY | Age: 60
End: 2020-11-02

## 2020-11-03 ENCOUNTER — DOCUMENTATION ONLY (OUTPATIENT)
Dept: ORTHOPEDIC SURGERY | Age: 60
End: 2020-11-03

## 2020-11-03 NOTE — PROGRESS NOTES
Mercedes Philip [facility] [employer], Attending Physician Statement, was received via fax and placed in the forms bin at .

## 2020-11-04 ENCOUNTER — HOSPITAL ENCOUNTER (OUTPATIENT)
Dept: LAB | Age: 60
Discharge: HOME | End: 2020-11-04
Payer: COMMERCIAL

## 2020-11-04 DIAGNOSIS — Z01.818 PREOP EXAMINATION: Primary | ICD-10-CM

## 2020-11-04 DIAGNOSIS — Z01.818 PREOP EXAMINATION: ICD-10-CM

## 2020-11-04 LAB
ALBUMIN SERPL-MCNC: 4.1 G/DL (ref 3.4–5)
ALBUMIN/GLOB SERPL: 1.5 {RATIO} (ref 0.8–1.7)
ALP SERPL-CCNC: 56 U/L (ref 45–117)
ALT SERPL-CCNC: 21 U/L (ref 13–56)
ANION GAP SERPL CALC-SCNC: 4 MMOL/L (ref 3–18)
AST SERPL-CCNC: 21 U/L (ref 10–38)
BASOPHILS # BLD: 0.1 K/UL (ref 0–0.1)
BASOPHILS NFR BLD: 1 % (ref 0–2)
BILIRUB SERPL-MCNC: 0.3 MG/DL (ref 0.2–1)
BUN SERPL-MCNC: 22 MG/DL (ref 7–18)
BUN/CREAT SERPL: 21 (ref 12–20)
CALCIUM SERPL-MCNC: 9.2 MG/DL (ref 8.5–10.1)
CHLORIDE SERPL-SCNC: 108 MMOL/L (ref 100–111)
CO2 SERPL-SCNC: 29 MMOL/L (ref 21–32)
CREAT SERPL-MCNC: 1.06 MG/DL (ref 0.6–1.3)
DIFFERENTIAL METHOD BLD: ABNORMAL
EOSINOPHIL # BLD: 0.2 K/UL (ref 0–0.4)
EOSINOPHIL NFR BLD: 2 % (ref 0–5)
ERYTHROCYTE [DISTWIDTH] IN BLOOD BY AUTOMATED COUNT: 14.8 % (ref 11.6–14.5)
GLOBULIN SER CALC-MCNC: 2.8 G/DL (ref 2–4)
GLUCOSE SERPL-MCNC: 96 MG/DL (ref 74–99)
HBA1C MFR BLD: 7.3 % (ref 4.2–5.6)
HCT VFR BLD AUTO: 40.5 % (ref 35–45)
HGB BLD-MCNC: 12.3 G/DL (ref 12–16)
LYMPHOCYTES # BLD: 2.2 K/UL (ref 0.9–3.6)
LYMPHOCYTES NFR BLD: 29 % (ref 21–52)
MCH RBC QN AUTO: 24 PG (ref 24–34)
MCHC RBC AUTO-ENTMCNC: 30.4 G/DL (ref 31–37)
MCV RBC AUTO: 79.1 FL (ref 74–97)
MONOCYTES # BLD: 0.6 K/UL (ref 0.05–1.2)
MONOCYTES NFR BLD: 8 % (ref 3–10)
NEUTS SEG # BLD: 4.7 K/UL (ref 1.8–8)
NEUTS SEG NFR BLD: 60 % (ref 40–73)
PLATELET # BLD AUTO: 432 K/UL (ref 135–420)
PMV BLD AUTO: 10.2 FL (ref 9.2–11.8)
POTASSIUM SERPL-SCNC: 4.4 MMOL/L (ref 3.5–5.5)
PROT SERPL-MCNC: 6.9 G/DL (ref 6.4–8.2)
RBC # BLD AUTO: 5.12 M/UL (ref 4.2–5.3)
SODIUM SERPL-SCNC: 141 MMOL/L (ref 136–145)
WBC # BLD AUTO: 7.8 K/UL (ref 4.6–13.2)

## 2020-11-04 PROCEDURE — 80053 COMPREHEN METABOLIC PANEL: CPT

## 2020-11-04 PROCEDURE — 85025 COMPLETE CBC W/AUTO DIFF WBC: CPT

## 2020-11-04 PROCEDURE — 36415 COLL VENOUS BLD VENIPUNCTURE: CPT

## 2020-11-04 PROCEDURE — 83036 HEMOGLOBIN GLYCOSYLATED A1C: CPT

## 2020-11-12 ENCOUNTER — HOSPITAL ENCOUNTER (OUTPATIENT)
Dept: PREADMISSION TESTING | Age: 60
Discharge: HOME | End: 2020-11-12
Payer: COMMERCIAL

## 2020-11-12 PROCEDURE — 87635 SARS-COV-2 COVID-19 AMP PRB: CPT

## 2020-11-12 PROCEDURE — 36415 COLL VENOUS BLD VENIPUNCTURE: CPT

## 2020-11-14 NOTE — H&P
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  Worker's Compensation and legal considerations: none filed.         Vitals:     10/21/20 0926   BP: (!) 145/70   Pulse: 83   Resp: 16   Temp: (!) 95.3 °F (35.2 °C)   TempSrc: Temporal   SpO2: 98%   Weight: 163 lb (73.9 kg)   Height: 5' 4\" (1.626 m)   PainSc:   6   PainLoc: Finger                    Chief Complaint   Patient presents with    Hand Pain       swapnil hand pain            HPI: Patient comes in today with complaints of bilateral middle finger pain and locking as well as bumps on her right index and left middle fingers. She reports draining the left middle finger mucous cyst.     Date of onset: Chronic     Injury: No     Prior Treatment:  Yes: Comment: Bilateral middle finger A1 pulley injections by another provider     Numbness/ Tingling: No        ROS: Review of Systems - General ROS: negative  Psychological ROS: negative  ENT ROS: negative  Allergy and Immunology ROS: negative  Hematological and Lymphatic ROS: negative  Respiratory ROS: no cough, shortness of breath, or wheezing  Cardiovascular ROS: no chest pain or dyspnea on exertion  Gastrointestinal ROS: no abdominal pain, change in bowel habits, or black or bloody stools  Musculoskeletal ROS: positive for - pain in hand - bilateral  Neurological ROS: negative  Dermatological ROS: negative          Past Medical History:   Diagnosis Date    Hypertension           Surgical History   History reviewed.  No pertinent surgical history.               Current Outpatient Medications   Medication Sig Dispense Refill    metoprolol succinate (TOPROL-XL) 50 mg XL tablet Take  by mouth daily.        simvastatin (ZOCOR) 40 mg tablet TAKE 1 TABLET BY MOUTH EVERY DAY        fenofibric acid (TRILIPIX ER) 45 mg capsule Take  by mouth daily.        rosuvastatin (Crestor) 10 mg tablet Take 10 mg by mouth nightly.        colesevelam (WELCHOL) 625 mg tablet Take 1,875 mg by mouth two (2) times daily (with meals).        DULoxetine (CYMBALTA) 60 mg capsule Take 60 mg by mouth daily.        gabapentin (NEURONTIN) 800 mg tablet Take  by mouth three (3) times daily.        meloxicam (MOBIC) 7.5 mg tablet Take  by mouth daily.        pantoprazole (PROTONIX) 40 mg tablet Take 40 mg by mouth daily.        insulin lispro protamine/insulin lispro (HumaLOG Mix 50-50 Insuln U-100) 100 unit/mL (50-50) injection by SubCUTAneous route.        triamcinolone acetonide (KENALOG) 10 mg/mL injection 1 mL by Intra artICUlar route once for 1 dose. 1 Vial 0               Allergies   Allergen Reactions    Reglan [Metoclopramide Hcl] Other (comments)       Behavior problem    Sulfa (Sulfonamide Antibiotics) Hives               PE:      Physical Exam  Vitals signs and nursing note reviewed. Constitutional:       General: She is not in acute distress. Appearance: Normal appearance. She is not ill-appearing, toxic-appearing or diaphoretic. HENT:      Head: Normocephalic and atraumatic. Mouth/Throat:      Mouth: Mucous membranes are moist.   Eyes:      Extraocular Movements: Extraocular movements intact. Pupils: Pupils are equal, round, and reactive to light. Neck:      Musculoskeletal: Normal range of motion and neck supple. Cardiovascular:      Pulses: Normal pulses. Pulmonary:      Effort: Pulmonary effort is normal. No respiratory distress. Abdominal:      General: Abdomen is flat. There is no distension. Musculoskeletal: Normal range of motion. General: Swelling and tenderness present. No deformity or signs of injury. Right lower leg: No edema. Left lower leg: No edema. Skin:     General: Skin is warm and dry. Capillary Refill: Capillary refill takes less than 2 seconds. Findings: No bruising or erythema. Neurological:      General: No focal deficit present. Mental Status: She is alert and oriented to person, place, and time.    Psychiatric: Mood and Affect: Mood normal.         Behavior: Behavior normal.               Hand: There are mucous cysts noted on the right index finger and left middle finger. There is a scab over the left middle finger DIP joint secondary to previous drainage.     Examination L Digit(s) R Digit(s)   1st CMC Tenderness -   -     1st [facility] Grind -   -     Janet Nodes -   -     Heberden Nodes +   +     A1 Pulley Tenderness + LF + LF   Triggering + LF + LF   UCL Instability -   -     RCL Instability -   -     Lateral Stress Pain -   -     Palmar Cords -   -     Tabletop test -   -     Garrod's Pads -   -      Strength           Pinch Strength              ROM: Full           Imaging:      10/21/2020 plain films of left middle finger shows degenerative changes at the DIP joint.            ICD-10-CM ICD-9-CM     1. Trigger middle finger of left hand  M65.332 727.03 SCHEDULE SURGERY   2. Digital mucous cyst of finger of left hand  M67.442 727.43 AMB POC XRAY, FINGER(S), 2+ VIEWS         SCHEDULE SURGERY   3. 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 Trigger middle finger of right hand  M65.331 727.03 TRIAMCINOLONE ACETONIDE INJ         triamcinolone acetonide (KENALOG) 10 mg/mL injection         INJECT TENDON SHEATH/LIGAMENT   5. Digital mucous cyst of finger of right hand  M67.441 727.43     6.  Degenerative arthritis of distal interphalangeal joint of index finger of right hand  M15.1 715.94              Plan:      Schedule left middle finger A1 pulley release and left middle finger DIP joint mucous cyst excision, osteophyte excision, and possible rotational flap.     This procedure has been fully reviewed with the patient and written informed consent has been obtained.     The patient was counseled at length about the risks of yari Covid-19 during their perioperative period and any recovery window from their procedure.  The patient was made aware that yari Covid-19  may worsen their prognosis for recovering from their procedure and lend to a higher morbidity and/or mortality risk.  All material risks, benefits, and reasonable alternatives including postponing the procedure were discussed.  The patient does  wish to proceed with the procedure at this time.        Follow-up and Dispositions    · Return for 2 weeks postop.            Plan was reviewed with patient, who verbalized agreement and understanding of the plan

## 2020-11-15 LAB — SARS-COV-2, COV2NT: NOT DETECTED

## 2020-11-16 ENCOUNTER — DOCUMENTATION ONLY (OUTPATIENT)
Dept: ORTHOPEDIC SURGERY | Age: 60
End: 2020-11-16

## 2020-11-16 ENCOUNTER — ANESTHESIA EVENT (OUTPATIENT)
Dept: SURGERY | Age: 60
End: 2020-11-16
Payer: COMMERCIAL

## 2020-11-16 RX ORDER — EMPAGLIFLOZIN AND METFORMIN HYDROCHLORIDE 12.5; 1 MG/1; MG/1
1 TABLET ORAL 2 TIMES DAILY WITH MEALS
COMMUNITY

## 2020-11-16 RX ORDER — GABAPENTIN 100 MG/1
200 CAPSULE ORAL EVERY MORNING
COMMUNITY

## 2020-11-16 RX ORDER — ROSUVASTATIN CALCIUM 10 MG/1
10 TABLET, COATED ORAL
COMMUNITY

## 2020-11-16 RX ORDER — VALSARTAN 40 MG/1
20 TABLET ORAL DAILY
COMMUNITY

## 2020-11-16 NOTE — DIABETES MGMT
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 Successful patient contact at [phone] and obtained the following information:  She's sched for an outpatient finger surgery on 11/17/2020  Endocrinologist:  Dr. Nataliia Jha and Ms. Liss with [facility]  Last known A1c:  7.3%  Type of insulin pump:  Medtronic 670 G  Type of insulin:  Humalog  Basal rate(s):   12MN-5AM: 0.500 units/hr  5AM-1-PM: 0.65 units/hr  1PM-4PM: 0.700 units/hr  4PM-10PM: 0.75 units/hr  10PM-12MN: 0.550 units/hr  Bolus: Wizard (based on BG readings and anticipated carb intake)  Frequency of infusion set change: every 3 days  Does patient have any skin problem related to use of insulin pump? No.  Is patient able to recognize symptoms when blood sugar is below 70? Yes. Is patient able to state how to treat when blood sugar is below 70? Yes. Instructions given to patient:  Follow PAT instruction regarding NPO after midnight the night before surgery. Continue use of insulin pump after midnight at basal rate(s). No insulin bolus before surgery. Low blood sugar below 70 is an emergency. Follow instructions for treatment if she experience low blood sugar after midnight, but do not take any solids or dark liquids for treatment. Instead, take 4 oz of clear liquids like apple juice, 7Up, or ginger ale. Check BG within 15 minutes and repeat treatment every 15 minutes until blood sugar is above 70. Report episode of low blood sugar to the nurse and doctor upon arrival including the time of low blood sugar, and what she used to treat, and the amount. Patient verbalized understanding of above instructions.     Alejandra Simpson RN [facility]  Pager: [phone]

## 2020-11-16 NOTE — PROGRESS NOTES
Alexandra Zaragoza Life disability form and [employer] form completed and faxed to numbers on form.

## 2020-11-17 ENCOUNTER — ANESTHESIA (OUTPATIENT)
Dept: SURGERY | Age: 60
End: 2020-11-17
Payer: COMMERCIAL

## 2020-11-17 ENCOUNTER — HOSPITAL ENCOUNTER (OUTPATIENT)
Age: 60
Setting detail: OUTPATIENT SURGERY
Discharge: HOME | End: 2020-11-17
Attending: ORTHOPAEDIC SURGERY | Admitting: ORTHOPAEDIC SURGERY
Payer: COMMERCIAL

## 2020-11-17 VITALS
HEIGHT: 64 IN | TEMPERATURE: 97.1 F | RESPIRATION RATE: 16 BRPM | DIASTOLIC BLOOD PRESSURE: 73 MMHG | SYSTOLIC BLOOD PRESSURE: 130 MMHG | WEIGHT: 158.2 LBS | BODY MASS INDEX: 27.01 KG/M2 | OXYGEN SATURATION: 99 % | HEART RATE: 65 BPM

## 2020-11-17 DIAGNOSIS — M15.1 DEGENERATIVE ARTHRITIS OF DISTAL INTERPHALANGEAL JOINT OF MIDDLE FINGER OF LEFT HAND: ICD-10-CM

## 2020-11-17 DIAGNOSIS — Z98.890 S/P EXCISION OF GANGLION CYST: ICD-10-CM

## 2020-11-17 DIAGNOSIS — Z98.890 S/P TRIGGER FINGER RELEASE: ICD-10-CM

## 2020-11-17 DIAGNOSIS — M65.332 TRIGGER MIDDLE FINGER OF LEFT HAND: Primary | ICD-10-CM

## 2020-11-17 DIAGNOSIS — M67.442 DIGITAL MUCOUS CYST OF LEFT HAND: ICD-10-CM

## 2020-11-17 LAB
GLUCOSE BLD STRIP.AUTO-MCNC: 129 MG/DL (ref 70–110)
GLUCOSE BLD STRIP.AUTO-MCNC: 96 MG/DL (ref 70–110)

## 2020-11-17 PROCEDURE — 74011250636 HC RX REV CODE- 250/636: Performed by: ORTHOPAEDIC SURGERY

## 2020-11-17 PROCEDURE — 26160 REMOVE TENDON SHEATH LESION: CPT | Performed by: ORTHOPAEDIC SURGERY

## 2020-11-17 PROCEDURE — 74011000250 HC RX REV CODE- 250: Performed by: ORTHOPAEDIC SURGERY

## 2020-11-17 PROCEDURE — 82962 GLUCOSE BLOOD TEST: CPT

## 2020-11-17 PROCEDURE — 76060000032 HC ANESTHESIA 0.5 TO 1 HR: Performed by: ORTHOPAEDIC SURGERY

## 2020-11-17 PROCEDURE — 77030011985 HC AIRWY NP COVD -A: Performed by: ANESTHESIOLOGY

## 2020-11-17 PROCEDURE — 26055 INCISE FINGER TENDON SHEATH: CPT | Performed by: ORTHOPAEDIC SURGERY

## 2020-11-17 PROCEDURE — 74011000250 HC RX REV CODE- 250: Performed by: NURSE ANESTHETIST, CERTIFIED REGISTERED

## 2020-11-17 PROCEDURE — 2709999900 HC NON-CHARGEABLE SUPPLY: Performed by: ORTHOPAEDIC SURGERY

## 2020-11-17 PROCEDURE — 01810 ANES PX NRV MUSC F/ARM WRST: CPT | Performed by: NURSE ANESTHETIST, CERTIFIED REGISTERED

## 2020-11-17 PROCEDURE — 01810 ANES PX NRV MUSC F/ARM WRST: CPT | Performed by: ANESTHESIOLOGY

## 2020-11-17 PROCEDURE — 77030040361 HC SLV COMPR DVT MDII -B: Performed by: ORTHOPAEDIC SURGERY

## 2020-11-17 PROCEDURE — 76010000138 HC OR TIME 0.5 TO 1 HR: Performed by: ORTHOPAEDIC SURGERY

## 2020-11-17 PROCEDURE — 74011250636 HC RX REV CODE- 250/636: Performed by: NURSE ANESTHETIST, CERTIFIED REGISTERED

## 2020-11-17 PROCEDURE — 74011250637 HC RX REV CODE- 250/637: Performed by: NURSE ANESTHETIST, CERTIFIED REGISTERED

## 2020-11-17 PROCEDURE — 77030040356 HC CORD BPLR FRCP COVD -A: Performed by: ORTHOPAEDIC SURGERY

## 2020-11-17 PROCEDURE — 76210000063 HC OR PH I REC FIRST 0.5 HR: Performed by: ORTHOPAEDIC SURGERY

## 2020-11-17 PROCEDURE — 77030006689 HC BLD OPHTH BVR BD -A: Performed by: ORTHOPAEDIC SURGERY

## 2020-11-17 PROCEDURE — 77030002888 HC SUT CHRMC J&J -A: Performed by: ORTHOPAEDIC SURGERY

## 2020-11-17 PROCEDURE — 76210000021 HC REC RM PH II 0.5 TO 1 HR: Performed by: ORTHOPAEDIC SURGERY

## 2020-11-17 PROCEDURE — 77030031139 HC SUT VCRL2 J&J -A: Performed by: ORTHOPAEDIC SURGERY

## 2020-11-17 PROCEDURE — 77030040922 HC BLNKT HYPOTHRM STRY -A: Performed by: ORTHOPAEDIC SURGERY

## 2020-11-17 PROCEDURE — 77030010813: Performed by: ORTHOPAEDIC SURGERY

## 2020-11-17 PROCEDURE — 77030000032 HC CUF TRNQT ZIMM -B: Performed by: ORTHOPAEDIC SURGERY

## 2020-11-17 RX ORDER — PROPOFOL 10 MG/ML
VIAL (ML) INTRAVENOUS
Status: DISCONTINUED | OUTPATIENT
Start: 2020-11-17 | End: 2020-11-17 | Stop reason: HOSPADM

## 2020-11-17 RX ORDER — SODIUM CHLORIDE 0.9 % (FLUSH) 0.9 %
5-40 SYRINGE (ML) INJECTION AS NEEDED
Status: DISCONTINUED | OUTPATIENT
Start: 2020-11-17 | End: 2020-11-17 | Stop reason: HOSPADM

## 2020-11-17 RX ORDER — MAGNESIUM SULFATE 100 %
4 CRYSTALS MISCELLANEOUS AS NEEDED
Status: CANCELLED | OUTPATIENT
Start: 2020-11-17

## 2020-11-17 RX ORDER — SODIUM CHLORIDE 0.9 % (FLUSH) 0.9 %
5-40 SYRINGE (ML) INJECTION EVERY 8 HOURS
Status: DISCONTINUED | OUTPATIENT
Start: 2020-11-17 | End: 2020-11-17 | Stop reason: HOSPADM

## 2020-11-17 RX ORDER — BUPIVACAINE HYDROCHLORIDE AND EPINEPHRINE 2.5; 5 MG/ML; UG/ML
INJECTION, SOLUTION EPIDURAL; INFILTRATION; INTRACAUDAL; PERINEURAL AS NEEDED
Status: DISCONTINUED | OUTPATIENT
Start: 2020-11-17 | End: 2020-11-17 | Stop reason: HOSPADM

## 2020-11-17 RX ORDER — FAMOTIDINE 20 MG/1
20 TABLET, FILM COATED ORAL ONCE
Status: COMPLETED | OUTPATIENT
Start: 2020-11-17 | End: 2020-11-17

## 2020-11-17 RX ORDER — CEFAZOLIN SODIUM 2 G/50ML
2 SOLUTION INTRAVENOUS ONCE
Status: COMPLETED | OUTPATIENT
Start: 2020-11-17 | End: 2020-11-17

## 2020-11-17 RX ORDER — HYDROMORPHONE HYDROCHLORIDE 2 MG/ML
0.5 INJECTION, SOLUTION INTRAMUSCULAR; INTRAVENOUS; SUBCUTANEOUS
Status: CANCELLED | OUTPATIENT
Start: 2020-11-17

## 2020-11-17 RX ORDER — SODIUM CHLORIDE, SODIUM LACTATE, POTASSIUM CHLORIDE, CALCIUM CHLORIDE 600; 310; 30; 20 MG/100ML; MG/100ML; MG/100ML; MG/100ML
50 INJECTION, SOLUTION INTRAVENOUS CONTINUOUS
Status: DISCONTINUED | OUTPATIENT
Start: 2020-11-17 | End: 2020-11-17 | Stop reason: HOSPADM

## 2020-11-17 RX ORDER — ONDANSETRON 2 MG/ML
INJECTION INTRAMUSCULAR; INTRAVENOUS AS NEEDED
Status: DISCONTINUED | OUTPATIENT
Start: 2020-11-17 | End: 2020-11-17 | Stop reason: HOSPADM

## 2020-11-17 RX ORDER — INSULIN LISPRO 100 [IU]/ML
INJECTION, SOLUTION INTRAVENOUS; SUBCUTANEOUS ONCE
Status: CANCELLED | OUTPATIENT
Start: 2020-11-17 | End: 2020-11-17

## 2020-11-17 RX ORDER — LIDOCAINE HYDROCHLORIDE 10 MG/ML
0.1 INJECTION, SOLUTION EPIDURAL; INFILTRATION; INTRACAUDAL; PERINEURAL AS NEEDED
Status: DISCONTINUED | OUTPATIENT
Start: 2020-11-17 | End: 2020-11-17 | Stop reason: HOSPADM

## 2020-11-17 RX ORDER — LIDOCAINE HYDROCHLORIDE 20 MG/ML
INJECTION, SOLUTION EPIDURAL; INFILTRATION; INTRACAUDAL; PERINEURAL AS NEEDED
Status: DISCONTINUED | OUTPATIENT
Start: 2020-11-17 | End: 2020-11-17 | Stop reason: HOSPADM

## 2020-11-17 RX ORDER — DEXTROSE 50 % IN WATER (D50W) INTRAVENOUS SYRINGE
25-50 AS NEEDED
Status: CANCELLED | OUTPATIENT
Start: 2020-11-17

## 2020-11-17 RX ORDER — PROPOFOL 10 MG/ML
INJECTION, EMULSION INTRAVENOUS AS NEEDED
Status: DISCONTINUED | OUTPATIENT
Start: 2020-11-17 | End: 2020-11-17 | Stop reason: HOSPADM

## 2020-11-17 RX ORDER — ONDANSETRON 2 MG/ML
4 INJECTION INTRAMUSCULAR; INTRAVENOUS ONCE
Status: CANCELLED | OUTPATIENT
Start: 2020-11-17 | End: 2020-11-17

## 2020-11-17 RX ORDER — MIDAZOLAM HYDROCHLORIDE 1 MG/ML
INJECTION, SOLUTION INTRAMUSCULAR; INTRAVENOUS AS NEEDED
Status: DISCONTINUED | OUTPATIENT
Start: 2020-11-17 | End: 2020-11-17 | Stop reason: HOSPADM

## 2020-11-17 RX ORDER — INSULIN LISPRO 100 [IU]/ML
INJECTION, SOLUTION INTRAVENOUS; SUBCUTANEOUS ONCE
Status: DISCONTINUED | OUTPATIENT
Start: 2020-11-17 | End: 2020-11-17 | Stop reason: HOSPADM

## 2020-11-17 RX ORDER — FENTANYL CITRATE 50 UG/ML
50 INJECTION, SOLUTION INTRAMUSCULAR; INTRAVENOUS AS NEEDED
Status: CANCELLED | OUTPATIENT
Start: 2020-11-17

## 2020-11-17 RX ORDER — SODIUM CHLORIDE, SODIUM LACTATE, POTASSIUM CHLORIDE, CALCIUM CHLORIDE 600; 310; 30; 20 MG/100ML; MG/100ML; MG/100ML; MG/100ML
25 INJECTION, SOLUTION INTRAVENOUS CONTINUOUS
Status: CANCELLED | OUTPATIENT
Start: 2020-11-17

## 2020-11-17 RX ORDER — HYDROCODONE BITARTRATE AND ACETAMINOPHEN 7.5; 325 MG/1; MG/1
1 TABLET ORAL
Qty: 16 TAB | Refills: 0 | Status: SHIPPED | OUTPATIENT
Start: 2020-11-17 | End: 2020-11-21

## 2020-11-17 RX ADMIN — PROPOFOL 20 MG: 10 INJECTION, EMULSION INTRAVENOUS at 08:42

## 2020-11-17 RX ADMIN — CEFAZOLIN SODIUM 2 G: 2 SOLUTION INTRAVENOUS at 08:36

## 2020-11-17 RX ADMIN — PROPOFOL 50 MG: 10 INJECTION, EMULSION INTRAVENOUS at 08:34

## 2020-11-17 RX ADMIN — PROPOFOL 30 MG: 10 INJECTION, EMULSION INTRAVENOUS at 08:36

## 2020-11-17 RX ADMIN — SODIUM CHLORIDE, SODIUM LACTATE, POTASSIUM CHLORIDE, AND CALCIUM CHLORIDE 50 ML/HR: 600; 310; 30; 20 INJECTION, SOLUTION INTRAVENOUS at 07:57

## 2020-11-17 RX ADMIN — SODIUM CHLORIDE, SODIUM LACTATE, POTASSIUM CHLORIDE, AND CALCIUM CHLORIDE: 600; 310; 30; 20 INJECTION, SOLUTION INTRAVENOUS at 08:25

## 2020-11-17 RX ADMIN — FAMOTIDINE 20 MG: 20 TABLET, FILM COATED ORAL at 07:57

## 2020-11-17 RX ADMIN — LIDOCAINE HYDROCHLORIDE 100 MG: 20 INJECTION, SOLUTION EPIDURAL; INFILTRATION; INTRACAUDAL; PERINEURAL at 08:34

## 2020-11-17 RX ADMIN — PROPOFOL 30 MG: 10 INJECTION, EMULSION INTRAVENOUS at 08:45

## 2020-11-17 RX ADMIN — MIDAZOLAM HYDROCHLORIDE 2 MG: 2 INJECTION, SOLUTION INTRAMUSCULAR; INTRAVENOUS at 08:25

## 2020-11-17 RX ADMIN — PROPOFOL 100 MCG/KG/MIN: 10 INJECTION, EMULSION INTRAVENOUS at 08:31

## 2020-11-17 RX ADMIN — ONDANSETRON 4 MG: 2 INJECTION INTRAMUSCULAR; INTRAVENOUS at 09:02

## 2020-11-17 NOTE — OP NOTES
Operative Report    Patient: Martha Roberts MRN: [id number]  SSN: [id number]    YOB: 1960  Age: 64 y.o. Sex: female       Date of Surgery: 11/17/2020     Preoperative Diagnosis: M65.332 TRIGGER MIDDLE FINGER OF LEFT HAND  M67.442 DIGITAL MUCOUS CYST OF FINGER OF LEFT HAND     Postoperative Diagnosis: M65.332 TRIGGER MIDDLE FINGER OF LEFT HAND  M67.442 DIGITAL MUCOUS CYST OF FINGER OF LEFT HAND     Surgeon(s) and Role:     Berna Ladd, DO - Primary    Assistant Vannesa Ireland    Anesthesia: MAC and local    Procedure: Procedure(s):  LEFT MIDDLE FINGER MUCOUS CYST EXCISION [id number]  LEFT MIDDLE A1 PULLEY RELEASE [id number]    Findings: Resolving cyst cyst of DIP joint of left middle finger with remnants of ganglion root at DIP joint as well as osteophyte. Additionally there was inflammation of the A1 pulley of the left middle finger. Procedure in Detail:     Indications for procedure of and outlined in the perioperative documentation most notably refractory to conservative treatment. Informed consent was obtained from the patient. The risks and benefits of the procedure were discussed with the patient. They include but not limited to neurovascular injury, blood loss, infection, tendon injury, recurrence of cyst, recurrence of trigger finger, chronic pain, chronic stiffness, need for further surgery, complications from anesthesia including death, and the possibility of yari Covid. After informed consent was obtained from the patient she was taken back to the operative suite. A tourniquet was applied to the left upper extremity and 10 mL of quarter percent Marcaine plain was injected into the A1 pulley as well as in the form of a digital nerve block of the middle finger. The left upper extremity was prepped and draped in the normal sterile fashion. The arm was exsanguinated the tourniquet was elevated to 250 mmHg.   An incision was made over the A1 pulley of the middle finger utilizing the distal palmar crease. The subcutaneous tissues were dissected and electrocautery was used for hemostasis. The A1 pulley was identified and incised centrally. Attention was first turned distally where the A1 pulley was incised up to the level of the A2 pulley. Attention was then turned proximally where the A1 pulley was incised up to the palmar fascia. Attention was then directed towards the middle finger DIP joint where a inverted L-shaped incision was made around the mucous cyst.  The skin flap was elevated and it appeared that there was no longer connection between the ulceration at the skin and the mucous cyst at the DIP joint. There was some remnants of the mucous cyst at the DIP joint which were cauterized. Additionally osteophyte was partially excised at this level. Mucous cyst redness are also at the subcutaneous tissues were also cauterized. The tourniquet was let down electrocautery was used for hemostasis at both wounds. The tendons at the A1 pulley wound were excursed out of the wound and not found to be locking in any fashion. The finger DIP joint wound was closed with 5-0 chromic in interrupted fashion and the A1 pulley wound was closed with 4-0 Vicryl rapid absorbing suture. The patient was placed into sterile dressings of the finger in the hand and given appropriate wound care instructions follow-up with me in the outpatient setting and a prescription for pain medicine. Estimated Blood Loss:  5 mL    Tourniquet Time:   Total Tourniquet Time Documented:  Upper Arm (Left) - 12 minutes  Total: Upper Arm (Left) - 12 minutes        Implants: * No implants in log *            Specimens: * No specimens in log *        Drains: None                Complications: None    Counts: Sponge and needle counts were correct times two.     Signed By:  Tee Turcios DO     November 17, 2020

## 2020-11-17 NOTE — ANESTHESIA PREPROCEDURE EVALUATION
Relevant Problems   No relevant active problems       Anesthetic History   No history of anesthetic complications            Review of Systems / Medical History  Patient summary reviewed and pertinent labs reviewed    Pulmonary  Within defined limits                 Neuro/Psych   Within defined limits           Cardiovascular    Hypertension: well controlled                   GI/Hepatic/Renal  Within defined limits              Endo/Other    Diabetes: type 2         Other Findings              Physical Exam    Airway  Mallampati: II  TM Distance: 4 - 6 cm  Neck ROM: normal range of motion   Mouth opening: Normal     Cardiovascular  Regular rate and rhythm,  S1 and S2 normal,  no murmur, click, rub, or gallop             Dental         Pulmonary  Breath sounds clear to auscultation               Abdominal  Abdominal exam normal       Other Findings            Anesthetic Plan    ASA: 3  Anesthesia type: MAC            Anesthetic plan and risks discussed with: Patient

## 2020-11-17 NOTE — DISCHARGE INSTRUCTIONS
Ice and Elevate hand and finger    Keep dressing clean and dry and cover for showering    Remove Bandage on Friday, wash with soap and water, keep dry and do not apply ointment or cream.      Wash twice daily and dry well. Begin Gentle ROM exercises immediately      DISCHARGE SUMMARY from Nurse  PATIENT INSTRUCTIONS:  After general anesthesia or intravenous sedation, for 24 hours or while taking prescription Narcotics:  · Limit your activities  · Do not drive and operate hazardous machinery  · Do not make important personal or business decisions  · Do  not drink alcoholic beverages  · If you have not urinated within 8 hours after discharge, please contact your surgeon on call. Report the following to your surgeon:  · Excessive pain, swelling, redness or odor of or around the surgical area  · Temperature over 100.5  · Nausea and vomiting lasting longer than 4 hours or if unable to take medications  · Any signs of decreased circulation or nerve impairment to extremity: change in color, persistent  numbness, tingling, coldness or increase pain  · Any questions    What to do at Home:  Recommended activity: Activity as tolerated and no driving for today. *  Please give a list of your current medications to your Primary Care Provider. *  Please update this list whenever your medications are discontinued, doses are      changed, or new medications (including over-the-counter products) are added. *  Please carry medication information at all times in case of emergency situations. These are general instructions for a healthy lifestyle:    No smoking/ No tobacco products/ Avoid exposure to second hand smoke  Surgeon General's Warning:  Quitting smoking now greatly reduces serious risk to your health.     Obesity, smoking, and sedentary lifestyle greatly increases your risk for illness    A healthy diet, regular physical exercise & weight monitoring are important for maintaining a healthy lifestyle    You may be retaining fluid if you have a history of heart failure or if you experience any of the following symptoms:  Weight gain of 3 pounds or more overnight or 5 pounds in a week, increased swelling in our hands or feet or shortness of breath while lying flat in bed. Please call your doctor as soon as you notice any of these symptoms; do not wait until your next office visit. The discharge information has been reviewed with the patient. The patient verbalized understanding. Discharge medications reviewed with the patient and appropriate educational materials and side effects teaching were provided. ___________________________________________________________________________________________________________________________________    Patient Education   Ganglion Cyst Removal: What to Expect at Home  Your Recovery     Ganglion cyst removal is surgery to remove a ganglion that has caused pain or numbness or made it hard to do your activities. A ganglion is a small sac, or cyst, filled with a clear fluid that is thick like jelly. The cyst may look like a bump on your hand or wrist. Less often, a ganglion can appear on the feet, ankles, knees, or shoulders. The doctor made a cut (incision) in the skin over the ganglion. He or she removed the ganglion and the connecting tissue that allowed fluid to collect there. Then the incision was closed with stitches. You may have a splint over the area to limit movement until the area heals. Your doctor will tell you when it's okay to move the area. He or she may give you instructions on when you can do your normal activities again. Ganglions sometimes come back. New ganglions also may form in the area. This care sheet gives you a general idea about how long it will take for you to recover. But each person recovers at a different pace. Follow the steps below to get better as quickly as possible. How can you care for yourself at home?   Activity    · For 1 to 2 weeks after surgery on your hand or wrist, avoid activities that involve repeated arm or hand movements. These may include typing, using a computer mouse, vacuuming, or carrying things in the affected hand. Do not use power tools. And avoid other activities that make your hand vibrate.     · If the procedure involved your foot or ankle, ask your doctor if you need to do less walking or driving for a while.     · You may be able to go back to work 1 or 2 days after surgery. It depends on the type of work you do and how you feel.     · You may shower, but do not get the area wet until your doctor says it's okay. Keep the bandage dry by covering it with plastic. Do not take a bath, swim, use a hot tub, or soak your hand until your doctor says it's okay. Diet    · You can eat your normal diet when you feel well. If your stomach is upset, try bland, low-fat foods like plain rice, broiled chicken, toast, and yogurt. Medicines    · Your doctor will tell you if and when you can restart your medicines. He or she will also give you instructions about taking any new medicines.     · If you take aspirin or some other blood thinner, be sure to talk to your doctor. He or she will tell you if and when to start taking this medicine again. Make sure that you understand exactly what your doctor wants you to do.     · Be safe with medicines. Read and follow all instructions on the label. ? If the doctor gave you a prescription medicine for pain, take it as prescribed. ? If you are not taking a prescription pain medicine, ask your doctor if you can take an over-the-counter medicine. Incision care    · Leave the bandage on your hand until the doctor says it is okay to remove it. This is usually 2 or 3 days after surgery.     · After your doctor says you can take off your bandage, wash the area daily with clean water, and pat it dry. Don't use hydrogen peroxide or alcohol. They can slow healing.  You may cover the area with a gauze bandage if it oozes fluid or rubs against clothing. Change the bandage every day.     · Keep the area clean and dry.     · If you have a splint, do not take it off unless your doctor tells you to. Follow the splint care instructions your doctor gives you. Be careful not to put the splint on too tight. Exercise    · Follow your doctor's directions on when and how to move the area to keep it flexible and help reduce swelling. Your doctor may have you wear a splint or brace for a short time after the surgery.     · If the ganglion is on your wrist or hand, you may need therapy after you heal. This can help you regain movement, strength, and  in your wrist and hand. To get the best results, you need to do the exercises correctly and as often and as long as your doctor or your physical or occupational therapist tells you to. Ice and elevation    · If you have swelling, put ice or a cold pack on the area for 10 to 20 minutes at a time. Try to do this every 1 to 2 hours for the next 3 days (when you are awake) or until the swelling goes down. Put a thin cloth between the ice and your skin or splint.     · Prop up the area on a pillow anytime you sit or lie down for the first 2 or 3 days. Try to keep it above the level of your heart. This will help reduce swelling. Follow-up care is a key part of your treatment and safety. Be sure to make and go to all appointments, and call your doctor if you are having problems. It's also a good idea to know your test results and keep a list of the medicines you take. When should you call for help? Call 911 anytime you think you may need emergency care. For example, call if:    · You passed out (lost consciousness).     · You have chest pain, are short of breath, or cough up blood.    Call your doctor now or seek immediate medical care if:    · You have pain that does not get better after you take pain medicine.     · You have loose stitches, or your incision comes open.     · Bright red blood has soaked through the bandage over your incision.     · You have symptoms of infection, such as:  ? Increased pain, swelling, warmth, or redness. ? Red streaks leading from the area. ? Pus draining from the area. ? A fever.     · The area is cool or pale or changes color.     · The area is tingly, weak, or numb.     · You can't move the area.     · Your splint feels too tight. Watch closely for any changes in your health, and be sure to contact your doctor if:    · You do not get better as expected. Where can you learn more? Go to http://www.gray.com/  Enter G125 in the search box to learn more about \"Ganglion Cyst Removal: What to Expect at Home. \"  Current as of: March 2, 2020               Content Version: 12.6  © 8300-2463 [employer]. Care instructions adapted under license by [employer] (which disclaims liability or warranty for this information). If you have questions about a medical condition or this instruction, always ask your healthcare professional. [place] [address] any warranty or liability for your use of this information. Patient Education   Trigger Finger Release: What to Expect at Home  Your Recovery  Your finger and hand may be sore and swollen for several days. It may be hard to move your finger at first. This usually gets better after several weeks. You may feel numbness or tingling near the cut, called an incision, that the doctor made. This feeling will probably get better in a few days, but it may take several months to completely go away. Your doctor will take out your stitches 1 to 2 weeks after surgery. It will probably take about 6 weeks for your finger to heal completely. Once healed, your finger may move easily without pain. How soon you can return to work depends on your job. If you can do your job without using the hand, you may be able to go back 1 or 2 days after surgery.  But if your job requires you to do repeated finger movements, put pressure on your hand, or lift things, you may need to take up to 6 weeks off work. Your doctor can help you decide how much time you will need to take off work. This care sheet gives you a general idea about how long it will take for you to recover. But each person recovers at a different pace. Follow the steps below to get better as quickly as possible. How can you care for yourself at home? Activity    · Rest when you feel tired. Getting enough sleep will help you recover.     · Try to walk each day. Start by walking a little more than you did the day before. Bit by bit, increase the amount you walk.     · For 1 to 2 weeks after surgery, avoid using your hand. This includes lifting things heavier than 1 to 2 pounds or doing repeated finger or hand movements, such as typing, using a computer mouse, washing windows, vacuuming, or chopping food. Do not use power tools, and avoid other activities that make your hand vibrate.     · Ask your doctor when you can drive again.     · You may be able to go back to work 1 or 2 days after surgery. It depends on the type of work you do and how you feel.     · You may shower, but do not get your hand wet until your doctor says it is okay. Keep the bandage dry by covering it with plastic. Do not take a bath, swim, use a hot tub, or soak your hand until your doctor says it is okay. Diet    · You can eat your normal diet. If your stomach is upset, try bland, low-fat foods like plain rice, broiled chicken, toast, and yogurt. Medicines    · Your doctor will tell you if and when you can restart your medicines. He or she will also give you instructions about taking any new medicines.     · If you take aspirin or some other blood thinner, ask your doctor if and when to start taking it again. Make sure that you understand exactly what your doctor wants you to do.     · Take pain medicines exactly as directed.   ? If the doctor gave you a prescription medicine for pain, take it as prescribed. ? If you are not taking a prescription pain medicine, ask your doctor if you can take an over-the-counter medicine.     · If you think your pain medicine is making you sick to your stomach:  ? Take your medicine after meals (unless your doctor has told you not to). ? Ask your doctor for a different pain medicine.     · If your doctor prescribed antibiotics, take them as directed. Do not stop taking them just because you feel better. You need to take the full course of antibiotics. Incision care    · Leave the bandage on your hand until the doctor says it is okay to remove it. This is usually 2 or 3 days after surgery.     · After the doctor says you can take off your bandage, wash the area daily with warm, soapy water and pat it dry. Don't use hydrogen peroxide or alcohol, which can slow healing. You may cover the area with a gauze bandage if it weeps or rubs against clothing. Change the bandage every day.     · Keep the area clean and dry. Exercise    · Gently bend and straighten your fingers throughout the day to keep them flexible and help reduce swelling.     · You may need finger and hand therapy. This helps you regain range of motion, strength, and  in your finger and hand. To get the best results, you need to do the exercises correctly and as often and as long as your doctor or your physical or occupational therapist tells you to. Ice and elevation    · Put ice or a cold pack on your hand and wrist for 10 to 20 minutes at a time. Try to do this every 1 to 2 hours for the next 3 days (when you are awake) or until the swelling goes down. Put a thin cloth between the ice and your skin.     · Prop up your hand on a pillow anytime you sit or lie down during the first 2 or 3 days after surgery. Try to keep the hand above the level of your heart. This will help reduce swelling. Follow-up care is a key part of your treatment and safety.  Be sure to make and go to all appointments, and call your doctor if you are having problems. It's also a good idea to know your test results and keep a list of the medicines you take. When should you call for help? Call 911 anytime you think you may need emergency care. For example, call if:    · You passed out (lost consciousness).     · You have severe trouble breathing.     · You have sudden chest pain and shortness of breath, or you cough up blood. Call your doctor now or seek immediate medical care if:    · You have pain that does not get better after you take pain medicine.     · You have loose stitches, or your incision comes open.     · Your incision bleeds through a large bandage.     · You have signs of infection, such as:  ? Increased pain, swelling, warmth, or redness. ? Red streaks leading from the incision. ? Pus draining from the incision. ? Swollen lymph nodes in your neck, armpits, or groin. ? A fever.     · Your hand or fingers are cool or pale or change color.     · You have tingling or numbness in your hand or fingers.     · You cannot move your fingers. Watch closely for any changes in your health, and be sure to contact your doctor if:    · You are not getting better as expected. Where can you learn more? Go to http://www.gray.com/  Enter I372 in the search box to learn more about \"Trigger Finger Release: What to Expect at Home. \"  Current as of: March 2, 2020               Content Version: 12.6  © 5719-1560 [employer]. Care instructions adapted under license by [employer] (which disclaims liability or warranty for this information). If you have questions about a medical condition or this instruction, always ask your healthcare professional. [place] [address] any warranty or liability for your use of this information.

## 2020-11-17 NOTE — H&P
Update History & Physical    The Patient's History and Physical of October 21, 2020 was reviewed with the patient and I examined the patient. There was no change. The surgical site was confirmed by the patient and me. Plan:  The risk, benefits, expected outcome, and alternative to the recommended procedure have been discussed with the patient. Patient understands and wants to proceed with the procedure.     Electronically signed by Jerl Mohs, DO on 11/17/2020 at 7:58 AM

## 2020-11-17 NOTE — BRIEF OP NOTE
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 Procedure(s):  LEFT MIDDLE FINGER MUCOUS CYST EXCISION/ LEFT MIDDLE A1 PULLEY RELEASE    Surgeon(s):   Dustin Little DO    Surgical Assistant: Surg Asst-1: Marian Tompkins    Anesthesia: General     Estimated Blood Loss (mL): less than 50     Complications: None    Specimens: * No specimens in log *     Implants: * No implants in log *    Drains: * No LDAs found *    Findings: inflamed A1 Pulley and LF DIP Osteophytes with cyst sac remnants    Electronically Signed by Madhuri jJ DO on 11/17/2020 at 9:11 AM

## 2020-11-17 NOTE — ANESTHESIA POSTPROCEDURE EVALUATION
Procedure(s):  LEFT MIDDLE FINGER MUCOUS CYST EXCISION/ LEFT MIDDLE A1 PULLEY RELEASE. MAC    Anesthesia Post Evaluation      Multimodal analgesia: multimodal analgesia used between 6 hours prior to anesthesia start to PACU discharge  Patient location during evaluation: bedside  Patient participation: complete - patient participated  Level of consciousness: awake  Pain management: adequate  Airway patency: patent  Anesthetic complications: no  Cardiovascular status: stable  Respiratory status: acceptable  Hydration status: acceptable  Post anesthesia nausea and vomiting:  controlled      INITIAL Post-op Vital signs:   Vitals Value Taken Time   /60 11/17/2020  9:42 AM   Temp 36.6 °C (97.8 °F) 11/17/2020  9:28 AM   Pulse 63 11/17/2020  9:45 AM   Resp 13 11/17/2020  9:45 AM   SpO2 99 % 11/17/2020  9:45 AM   Vitals shown include unvalidated device data.

## 2020-11-20 ENCOUNTER — PATIENT MESSAGE (OUTPATIENT)
Dept: ORTHOPEDIC SURGERY | Age: 60
End: 2020-11-20

## 2020-11-20 ENCOUNTER — TELEPHONE (OUTPATIENT)
Dept: ORTHOPEDIC SURGERY | Age: 60
End: 2020-11-20

## 2020-11-20 NOTE — TELEPHONE ENCOUNTER
If she can upload a picture to [employer], please have her do so. Otherwise, let her now this should be fine and to just put a bandaid over the finger and change daily. We will evaluate at her first postop visit.

## 2020-11-20 NOTE — TELEPHONE ENCOUNTER
Post op patient called and said that the sutures are coming apart on the left hand middle finger. Patient is requesting a call back with some advice. Patient tel. [phone].

## 2020-11-23 ENCOUNTER — TELEPHONE (OUTPATIENT)
Dept: ORTHOPEDIC SURGERY | Age: 60
End: 2020-11-23

## 2020-11-23 NOTE — TELEPHONE ENCOUNTER
Dr. Jhony Barrios looked at the picture that the patient send to us via [employer]. Per Dr. Jhony Barrios the incision looks okay and to keep it cleaned and covered.

## 2020-11-23 NOTE — TELEPHONE ENCOUNTER
Post Op Patient called and is requesting a call back from either [facility] [employer] or  His Nurse. Patient said she took a picture of the incision on her Hand. That she placed it on My Chart. Patient is asking [facility] [employer] or his nurse take a look at the picture and call her back as soon as possible. That the incision is on the hand not on her finger. Patient tel. [phone].

## 2020-11-29 ENCOUNTER — NURSE TRIAGE (OUTPATIENT)
Dept: OTHER | Facility: CLINIC | Age: 60
End: 2020-11-29

## 2020-11-29 NOTE — TELEPHONE ENCOUNTER
Assisted her in finding urgent care in network. Reason for Disposition   [1] Incision looks infected (spreading redness, pain) AND [2] large red area (> 2 in. or 5 cm)    Answer Assessment - Initial Assessment Questions  1. SYMPTOM: \"What's the main symptom you're concerned about? \" (e.g., redness, pain, drainage)      Finger incision opened and red and with pus    2. ONSET: \"When did     start?\"      11/27/29    3. SURGERY: \"What surgery was performed? \"      Cyst removed    4. DATE of SURGERY: \"When was surgery performed? \"       11/17/20    5. INCISION SITE: \"Where is the incision located? \"       Left hand middle finger and palm    6. REDNESS: \"Is there any redness at the incision site? \" If yes, ask: \"How wide across is the redness? \" (Inches, centimeters)       Redness and swelling, 3\" cuticle to proximal joint    7. PAIN: \"Is there any pain? \" If so, ask: \"How bad is it? \"  (Scale 1-10; or mild, moderate, severe)      7/10    8. BLEEDING: \"Is there any bleeding? \" If so, ask: \"How much? \" and \"Where? \"      Scant, none now    9. DRAINAGE: \"Is there any drainage from the incision site? \" If yes, ask: \"What color and how much? \" (e.g., red, cloudy, pus; drops, teaspoon)      Yellow drainage , small amount, that started today. 10. FEVER: \"Do you have a fever? \" If so, ask: \"What is your temperature, how was it measured, and when did it start? \"        Denies    11. OTHER SYMPTOMS: \"Do you have any other symptoms? \" (e.g., shaking chills, weakness, rash elsewhere on body)        Denies    Protocols used: POST-OP INCISION SYMPTOMS AND QUESTIONS-ADULT-AH

## 2020-11-30 ENCOUNTER — OFFICE VISIT (OUTPATIENT)
Dept: ORTHOPEDIC SURGERY | Age: 60
End: 2020-11-30
Payer: COMMERCIAL

## 2020-11-30 ENCOUNTER — TELEPHONE (OUTPATIENT)
Dept: ORTHOPEDIC SURGERY | Age: 60
End: 2020-11-30

## 2020-11-30 VITALS
BODY MASS INDEX: 27.55 KG/M2 | HEIGHT: 64 IN | SYSTOLIC BLOOD PRESSURE: 123 MMHG | TEMPERATURE: 96.8 F | HEART RATE: 84 BPM | OXYGEN SATURATION: 94 % | WEIGHT: 161.4 LBS | DIASTOLIC BLOOD PRESSURE: 71 MMHG

## 2020-11-30 DIAGNOSIS — M67.442 DIGITAL MUCOUS CYST OF FINGER OF LEFT HAND: ICD-10-CM

## 2020-11-30 DIAGNOSIS — M15.1 DEGENERATIVE ARTHRITIS OF DISTAL INTERPHALANGEAL JOINT OF MIDDLE FINGER OF LEFT HAND: ICD-10-CM

## 2020-11-30 DIAGNOSIS — T81.49XA WOUND INFECTION AFTER SURGERY: Primary | ICD-10-CM

## 2020-11-30 DIAGNOSIS — M65.332 TRIGGER MIDDLE FINGER OF LEFT HAND: ICD-10-CM

## 2020-11-30 PROCEDURE — 99024 POSTOP FOLLOW-UP VISIT: CPT | Performed by: ORTHOPAEDIC SURGERY

## 2020-11-30 RX ORDER — CLINDAMYCIN HYDROCHLORIDE 300 MG/1
300 CAPSULE ORAL EVERY 6 HOURS
Qty: 28 CAP | Refills: 0 | Status: SHIPPED | OUTPATIENT
Start: 2020-11-30 | End: 2020-12-07

## 2020-11-30 RX ORDER — CEPHALEXIN 500 MG/1
500 CAPSULE ORAL 4 TIMES DAILY
COMMUNITY

## 2020-11-30 NOTE — TELEPHONE ENCOUNTER
Patient called reporting she went to urgent care over the weekend due to infection in the operative finger. She is asking if there is anything else she needs to do or be aware of prior to her upcoming visit on 12/02. She is currently taking Keflex prescribed from urgent care.   Please call back at [phone]

## 2020-11-30 NOTE — TELEPHONE ENCOUNTER
As per dr Cheko Quarles, message given to [facility] to reschedule patient to be seen today at 2pm at [facility].

## 2020-11-30 NOTE — PROGRESS NOTES
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  Worker's Compensation and legal considerations: none filed. Vitals:    11/30/20 1403   BP: 123/71   Pulse: 84   Temp: 96.8 °F (36 °C)   TempSrc: Temporal   SpO2: 94%   Weight: 161 lb 6.4 oz (73.2 kg)   Height: 5' 4\" (1.626 m)   PainSc:   9   PainLoc: Finger           Chief Complaint   Patient presents with    Finger Pain     left middle finger       HPI: Patient comes in today for first postoperative visit early less than 2 weeks status post left middle finger A1 pulley release and left middle finger mucous cyst excision. She has had some dehiscence of her A1 pulley incision but overall it is starting to heal from within. Additionally the middle finger DIP joint has been having erythema and drainage. She was recently seen in urgent care and given a prescription for antibiotics. Initial HPI: Patient comes in today with complaints of bilateral middle finger pain and locking as well as bumps on her right index and left middle fingers.   She reports draining the left middle finger mucous cyst.    Date of onset: Chronic    Injury: No    Prior Treatment:  Yes: Comment: Bilateral middle finger A1 pulley injections by another provider    Numbness/ Tingling: No      ROS: Review of Systems - General ROS: negative  Psychological ROS: negative  ENT ROS: negative  Allergy and Immunology ROS: negative  Hematological and Lymphatic ROS: negative  Respiratory ROS: no cough, shortness of breath, or wheezing  Cardiovascular ROS: no chest pain or dyspnea on exertion  Gastrointestinal ROS: no abdominal pain, change in bowel habits, or black or bloody stools  Musculoskeletal ROS: positive for - pain in hand - bilateral  Neurological ROS: negative  Dermatological ROS: negative    Past Medical History:   Diagnosis Date    DM (diabetes mellitus) ([facility] [address]     Gastroparesis     Hypertension     Kidney stones     microscopic    Lymphocytic colitis     Peripheral neuropathy        Past Surgical History:   Procedure Laterality Date    HX HYSTERECTOMY      HX ORTHOPAEDIC      BROKEN Collar bone repair        Current Outpatient Medications   Medication Sig Dispense Refill    cephALEXin (Keflex) 500 mg capsule Take 500 mg by mouth four (4) times daily.  clindamycin (CLEOCIN) 300 mg capsule Take 1 Cap by mouth every six (6) hours for 7 days. 28 Cap 0    gabapentin (NEURONTIN) 100 mg capsule Take 200 mg by mouth Every morning.  rosuvastatin (Crestor) 10 mg tablet Take 10 mg by mouth nightly.  empagliflozin-metFORMIN (Synjardy) 12.5-1,000 mg per tablet Take 1 Tab by mouth two (2) times daily (with meals).  valsartan (DIOVAN) 40 mg tablet Take 20 mg by mouth daily.  metoprolol succinate (TOPROL-XL) 50 mg XL tablet Take 25 mg by mouth daily.  simvastatin (ZOCOR) 40 mg tablet TAKE 1 TABLET BY MOUTH EVERY DAY      fenofibric acid (TRILIPIX ER) 45 mg capsule Take  by mouth daily.  rosuvastatin (Crestor) 10 mg tablet Take 10 mg by mouth nightly.  colesevelam (WELCHOL) 625 mg tablet Take 2,000 mg by mouth.  DULoxetine (CYMBALTA) 60 mg capsule Take 60 mg by mouth two (2) times a day.  meloxicam (MOBIC) 7.5 mg tablet Take  by mouth two (2) times a day.  pantoprazole (PROTONIX) 40 mg tablet Take 40 mg by mouth two (2) times a day.  insulin lispro protamine/insulin lispro (HumaLOG Mix 50-50 Insuln U-100) 100 unit/mL (50-50) injection by SubCUTAneous route.  gabapentin (NEURONTIN) 800 mg tablet Take 600 mg by mouth nightly. Allergies   Allergen Reactions    Ace Inhibitors Cough     wheezig      Reglan [Metoclopramide Hcl] Other (comments)     Behavior problem    Sulfa (Sulfonamide Antibiotics) Hives           PE:     Physical Exam  Vitals signs and nursing note reviewed. Constitutional:       General: She is not in acute distress. Appearance: Normal appearance.  She is not ill-appearing, toxic-appearing or diaphoretic. HENT:      Head: Normocephalic and atraumatic. Mouth/Throat:      Mouth: Mucous membranes are moist.   Eyes:      Extraocular Movements: Extraocular movements intact. Pupils: Pupils are equal, round, and reactive to light. Neck:      Musculoskeletal: Normal range of motion and neck supple. Cardiovascular:      Pulses: Normal pulses. Pulmonary:      Effort: Pulmonary effort is normal. No respiratory distress. Abdominal:      General: Abdomen is flat. There is no distension. Musculoskeletal: Normal range of motion. General: Swelling and tenderness present. No deformity or signs of injury. Right lower leg: No edema. Left lower leg: No edema. Skin:     General: Skin is warm and dry. Capillary Refill: Capillary refill takes less than 2 seconds. Findings: Erythema present. No bruising. Neurological:      General: No focal deficit present. Mental Status: She is alert and oriented to person, place, and time. Psychiatric:         Mood and Affect: Mood normal.         Behavior: Behavior normal.            Left hand: Hand incision is clean dry and intact no drainage breakdown erythema or any signs of infection. Middle finger incision has dehisced somewhat with erythema and signs of infection. Sensation is intact distally and cap refill is brisk. There is pain with range of motion. Imaging:     10/21/2020 plain films of left middle finger shows degenerative changes at the DIP joint. ICD-10-CM ICD-9-CM    1. Wound infection after surgery  T81.49XA 998.59 clindamycin (CLEOCIN) 300 mg capsule   2. Digital mucous cyst of finger of left hand  M67.442 727.43    3. Trigger middle finger of left hand  M65.332 727.03    4.  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 I have also instructed her on Dakin soaks. At this point I do not think she has a septic DIP joint but if this does not improve in the next week or so we may consider debridement. Follow-up and Dispositions    · Return in about 10 days (around 12/10/2020) for Reevaluation, wound check.           Plan was reviewed with patient, who verbalized agreement and understanding of the plan

## 2020-12-01 ENCOUNTER — PATIENT MESSAGE (OUTPATIENT)
Dept: ORTHOPEDIC SURGERY | Age: 60
End: 2020-12-01

## 2020-12-09 ENCOUNTER — OFFICE VISIT (OUTPATIENT)
Dept: ORTHOPEDIC SURGERY | Age: 60
End: 2020-12-09
Payer: COMMERCIAL

## 2020-12-09 VITALS
DIASTOLIC BLOOD PRESSURE: 68 MMHG | HEIGHT: 64 IN | RESPIRATION RATE: 15 BRPM | TEMPERATURE: 96.9 F | BODY MASS INDEX: 27.86 KG/M2 | WEIGHT: 163.2 LBS | HEART RATE: 79 BPM | SYSTOLIC BLOOD PRESSURE: 119 MMHG

## 2020-12-09 DIAGNOSIS — M65.332 TRIGGER MIDDLE FINGER OF LEFT HAND: ICD-10-CM

## 2020-12-09 DIAGNOSIS — M15.1 DEGENERATIVE ARTHRITIS OF DISTAL INTERPHALANGEAL JOINT OF MIDDLE FINGER OF LEFT HAND: ICD-10-CM

## 2020-12-09 DIAGNOSIS — T81.49XA WOUND INFECTION AFTER SURGERY: Primary | ICD-10-CM

## 2020-12-09 DIAGNOSIS — M67.442 DIGITAL MUCOUS CYST OF FINGER OF LEFT HAND: ICD-10-CM

## 2020-12-09 PROCEDURE — 99024 POSTOP FOLLOW-UP VISIT: CPT | Performed by: ORTHOPAEDIC SURGERY

## 2020-12-09 NOTE — PROGRESS NOTES
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  Worker's Compensation and legal considerations: none filed. Vitals:    12/09/20 1328   BP: 119/68   Pulse: 79   Resp: 15   Temp: 96.9 °F (36.1 °C)   Weight: 163 lb 3.2 oz (74 kg)   Height: 5' 4\" (1.626 m)   PainSc:   0 - No pain   PainLoc: Hand           Chief Complaint   Patient presents with    Hand Pain     left       HPI: Patient comes in today for second postoperative appointment approximately 3 weeks status post left middle finger A1 pulley release and left middle finger mucous cyst excision. At her last visit she was placed on antibiotics due to infection at the distal incision of her finger. She has been doing Dakin soaks. She reports to be doing much better with some redness but much improved. She is completing her antibiotics today. She would like to return to work. 2wk HPI: Patient comes in today for first postoperative visit early less than 2 weeks status post left middle finger A1 pulley release and left middle finger mucous cyst excision. She has had some dehiscence of her A1 pulley incision but overall it is starting to heal from within. Additionally the middle finger DIP joint has been having erythema and drainage. She was recently seen in urgent care and given a prescription for antibiotics. Initial HPI: Patient comes in today with complaints of bilateral middle finger pain and locking as well as bumps on her right index and left middle fingers. She reports draining the left middle finger mucous cyst.    Date of onset: Chronic    Injury: No    Prior Treatment:  Yes: Comment: Left middle finger A1 pulley release and left middle finger mucous cyst excision as well as antibiotics for postoperative infection. x    Numbness/ Tingling: No      ROS: Review of Systems - General ROS: negative  Psychological ROS: negative  ENT ROS: negative  Allergy and Immunology ROS: negative  Hematological and Lymphatic ROS: negative  Respiratory ROS: no cough, shortness of breath, or wheezing  Cardiovascular ROS: no chest pain or dyspnea on exertion  Gastrointestinal ROS: no abdominal pain, change in bowel habits, or black or bloody stools  Musculoskeletal ROS: positive for - pain in hand - bilateral  Neurological ROS: negative  Dermatological ROS: negative    Past Medical History:   Diagnosis Date    DM [facility]diabetes mellitus) ([facility] [address]     Gastroparesis     Hypertension     Kidney stones     microscopic    Lymphocytic colitis     Peripheral neuropathy        Past Surgical History:   Procedure Laterality Date    HX HYSTERECTOMY      HX ORTHOPAEDIC      BROKEN Collar bone repair        Current Outpatient Medications   Medication Sig Dispense Refill    cephALEXin (Keflex) 500 mg capsule Take 500 mg by mouth four (4) times daily.  gabapentin (NEURONTIN) 100 mg capsule Take 200 mg by mouth Every morning.  rosuvastatin (Crestor) 10 mg tablet Take 10 mg by mouth nightly.  empagliflozin-metFORMIN (Synjardy) 12.5-1,000 mg per tablet Take 1 Tab by mouth two (2) times daily (with meals).  valsartan (DIOVAN) 40 mg tablet Take 20 mg by mouth daily.  metoprolol succinate (TOPROL-XL) 50 mg XL tablet Take 25 mg by mouth daily.  simvastatin (ZOCOR) 40 mg tablet TAKE 1 TABLET BY MOUTH EVERY DAY      fenofibric acid (TRILIPIX ER) 45 mg capsule Take  by mouth daily.  rosuvastatin (Crestor) 10 mg tablet Take 10 mg by mouth nightly.  colesevelam (WELCHOL) 625 mg tablet Take 2,000 mg by mouth.  DULoxetine (CYMBALTA) 60 mg capsule Take 60 mg by mouth two (2) times a day.  meloxicam (MOBIC) 7.5 mg tablet Take  by mouth two (2) times a day.  pantoprazole (PROTONIX) 40 mg tablet Take 40 mg by mouth two (2) times a day.  insulin lispro protamine/insulin lispro (HumaLOG Mix 50-50 Insuln U-100) 100 unit/mL (50-50) injection by SubCUTAneous route.       gabapentin (NEURONTIN) 800 mg tablet Take 600 mg by mouth nightly. Allergies   Allergen Reactions    Ace Inhibitors Cough     wheezig      Reglan [Metoclopramide Hcl] Other (comments)     Behavior problem    Sulfa (Sulfonamide Antibiotics) Hives           PE:     Physical Exam  Vitals signs and nursing note reviewed. Constitutional:       General: She is not in acute distress. Appearance: Normal appearance. She is not ill-appearing. Eyes:      Extraocular Movements: Extraocular movements intact. Pupils: Pupils are equal, round, and reactive to light. Neck:      Musculoskeletal: Normal range of motion and neck supple. Cardiovascular:      Pulses: Normal pulses. Pulmonary:      Effort: Pulmonary effort is normal. No respiratory distress. Abdominal:      General: Abdomen is flat. There is no distension. Musculoskeletal: Normal range of motion. General: No swelling, tenderness, deformity or signs of injury. Right lower leg: No edema. Left lower leg: No edema. Skin:     General: Skin is warm and dry. Capillary Refill: Capillary refill takes less than 2 seconds. Findings: Erythema present. No bruising. Neurological:      General: No focal deficit present. Mental Status: She is alert and oriented to person, place, and time. Cranial Nerves: No cranial nerve deficit. Sensory: No sensory deficit. Psychiatric:         Mood and Affect: Mood normal.         Behavior: Behavior normal.            Left hand: Hand incision is mostly healed. Wound about the middle finger is healing from within and superficially open. There is no drainage noted. Erythema is much improved from previous visit. Sensation is intact distally and cap refill is brisk. Range of motion is full in all digits. Imaging:     10/21/2020 plain films of left middle finger shows degenerative changes at the DIP joint. ICD-10-CM ICD-9-CM    1. Wound infection after surgery  T81.49XA 998.59    2.  Digital mucous cyst of finger of left hand  M67.442 727.43    3. Trigger middle finger of left hand  M65.332 727.03    4. 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 Return to work full duty no restrictions. Follow-up and Dispositions    · Return in about 4 weeks (around 1/6/2021) for reevaluation and wound check.           Plan was reviewed with patient, who verbalized agreement and understanding of the plan

## 2020-12-21 ENCOUNTER — OFFICE VISIT (OUTPATIENT)
Dept: ORTHOPEDIC SURGERY | Age: 60
End: 2020-12-21
Payer: COMMERCIAL

## 2020-12-21 VITALS
RESPIRATION RATE: 16 BRPM | HEART RATE: 81 BPM | WEIGHT: 163 LBS | TEMPERATURE: 96.9 F | SYSTOLIC BLOOD PRESSURE: 134 MMHG | OXYGEN SATURATION: 97 % | BODY MASS INDEX: 27.83 KG/M2 | HEIGHT: 64 IN | DIASTOLIC BLOOD PRESSURE: 63 MMHG

## 2020-12-21 DIAGNOSIS — M15.1 DEGENERATIVE ARTHRITIS OF DISTAL INTERPHALANGEAL JOINT OF MIDDLE FINGER OF LEFT HAND: ICD-10-CM

## 2020-12-21 DIAGNOSIS — M67.442 DIGITAL MUCOUS CYST OF FINGER OF LEFT HAND: Primary | ICD-10-CM

## 2020-12-21 PROCEDURE — 99024 POSTOP FOLLOW-UP VISIT: CPT | Performed by: ORTHOPAEDIC SURGERY

## 2020-12-21 RX ORDER — DICLOFENAC SODIUM 75 MG/1
75 TABLET, DELAYED RELEASE ORAL 2 TIMES DAILY WITH MEALS
Qty: 28 TAB | Refills: 0 | Status: SHIPPED | OUTPATIENT
Start: 2020-12-21 | End: 2020-12-21

## 2020-12-21 RX ORDER — DICLOFENAC SODIUM 75 MG/1
75 TABLET, DELAYED RELEASE ORAL 2 TIMES DAILY WITH MEALS
Qty: 28 TAB | Refills: 0 | Status: SHIPPED | OUTPATIENT
Start: 2020-12-21 | End: 2021-01-04

## 2020-12-21 NOTE — PROGRESS NOTES
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  Worker's Compensation and legal considerations: none filed. Vitals:    12/21/20 0807   BP: 134/63   Pulse: 81   Resp: 16   Temp: 96.9 °F (36.1 °C)   TempSrc: Temporal   SpO2: 97%   Weight: 163 lb (73.9 kg)   Height: 5' 4\" (1.626 m)   PainSc:   0 - No pain   PainLoc: Finger           Chief Complaint   Patient presents with    Hand Pain     left finger pain       HPI: Patient presents today approximately 4 weeks status post left middle finger mucous cyst excision and left middle finger A1 pulley release. The A1 pulley site appears to be doing fine but she is continuing to have erythema and some tenderness about the mucous cyst surgical site. 3wk HPI: Patient comes in today for second postoperative appointment approximately 3 weeks status post left middle finger A1 pulley release and left middle finger mucous cyst excision. At her last visit she was placed on antibiotics due to infection at the distal incision of her finger. She has been doing Dakin soaks. She reports to be doing much better with some redness but much improved. She is completing her antibiotics today. She would like to return to work. 2wk HPI: Patient comes in today for first postoperative visit early less than 2 weeks status post left middle finger A1 pulley release and left middle finger mucous cyst excision. She has had some dehiscence of her A1 pulley incision but overall it is starting to heal from within. Additionally the middle finger DIP joint has been having erythema and drainage. She was recently seen in urgent care and given a prescription for antibiotics. Initial HPI: Patient comes in today with complaints of bilateral middle finger pain and locking as well as bumps on her right index and left middle fingers.   She reports draining the left middle finger mucous cyst.    Date of onset: Chronic    Injury: No    Prior Treatment:  Yes: Comment: Left middle finger A1 pulley release and left middle finger mucous cyst excision as well as antibiotics for postoperative infection. x    Numbness/ Tingling: No      ROS: Review of Systems - General ROS: negative  Psychological ROS: negative  ENT ROS: negative  Allergy and Immunology ROS: negative  Hematological and Lymphatic ROS: negative  Respiratory ROS: no cough, shortness of breath, or wheezing  Cardiovascular ROS: no chest pain or dyspnea on exertion  Gastrointestinal ROS: no abdominal pain, change in bowel habits, or black or bloody stools  Musculoskeletal ROS: positive for - pain in hand - bilateral  Neurological ROS: negative  Dermatological ROS: negative    Past Medical History:   Diagnosis Date    DM [facility]diabetes mellitus) ([facility] [address]     Gastroparesis     Hypertension     Kidney stones     microscopic    Lymphocytic colitis     Peripheral neuropathy        Past Surgical History:   Procedure Laterality Date    HX HYSTERECTOMY      HX ORTHOPAEDIC      BROKEN Collar bone repair        Current Outpatient Medications   Medication Sig Dispense Refill    diclofenac EC (VOLTAREN) 75 mg EC tablet Take 1 Tab by mouth two (2) times daily (with meals) for 14 days. 28 Tab 0    cephALEXin (Keflex) 500 mg capsule Take 500 mg by mouth four (4) times daily.  gabapentin (NEURONTIN) 100 mg capsule Take 200 mg by mouth Every morning.  rosuvastatin (Crestor) 10 mg tablet Take 10 mg by mouth nightly.  empagliflozin-metFORMIN (Synjardy) 12.5-1,000 mg per tablet Take 1 Tab by mouth two (2) times daily (with meals).  valsartan (DIOVAN) 40 mg tablet Take 20 mg by mouth daily.  metoprolol succinate (TOPROL-XL) 50 mg XL tablet Take 25 mg by mouth daily.  simvastatin (ZOCOR) 40 mg tablet TAKE 1 TABLET BY MOUTH EVERY DAY      fenofibric acid (TRILIPIX ER) 45 mg capsule Take  by mouth daily.  rosuvastatin (Crestor) 10 mg tablet Take 10 mg by mouth nightly.       colesevelam (WELCHOL) 625 mg tablet Take 2,000 mg by mouth.  DULoxetine (CYMBALTA) 60 mg capsule Take 60 mg by mouth two (2) times a day.  pantoprazole (PROTONIX) 40 mg tablet Take 40 mg by mouth two (2) times a day.  insulin lispro protamine/insulin lispro (HumaLOG Mix 50-50 Insuln U-100) 100 unit/mL (50-50) injection by SubCUTAneous route. Allergies   Allergen Reactions    Ace Inhibitors Cough     wheezig      Reglan [Metoclopramide Hcl] Other (comments)     Behavior problem    Sulfa (Sulfonamide Antibiotics) Hives           PE:     Physical Exam  Vitals signs and nursing note reviewed. Constitutional:       General: She is not in acute distress. Appearance: Normal appearance. She is not ill-appearing. Eyes:      Extraocular Movements: Extraocular movements intact. Pupils: Pupils are equal, round, and reactive to light. Neck:      Musculoskeletal: Normal range of motion and neck supple. Cardiovascular:      Pulses: Normal pulses. Pulmonary:      Effort: Pulmonary effort is normal. No respiratory distress. Abdominal:      General: Abdomen is flat. There is no distension. Musculoskeletal: Normal range of motion. General: Tenderness present. No swelling, deformity or signs of injury. Right lower leg: No edema. Left lower leg: No edema. Skin:     General: Skin is warm and dry. Capillary Refill: Capillary refill takes less than 2 seconds. Findings: Erythema present. No bruising. Neurological:      General: No focal deficit present. Mental Status: She is alert and oriented to person, place, and time. Cranial Nerves: No cranial nerve deficit. Sensory: No sensory deficit. Psychiatric:         Mood and Affect: Mood normal.         Behavior: Behavior normal.            Left hand: A1 pulley incision healed. Range of motion of the middle finger full. Continued erythema but early granulation tissue about the mucous cyst incision.   No signs of active infection drainage or warmth. There is some continued erythema though. Imaging:     10/21/2020 plain films of left middle finger shows degenerative changes at the DIP joint. ICD-10-CM ICD-9-CM    1. Digital mucous cyst of finger of left hand  M67.442 727.43 diclofenac EC (VOLTAREN) 75 mg EC tablet   2. 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 I discussed with her ice and range of motion exercises and we will trial a 2-week course of anti-inflammatory. Follow-up and Dispositions    · Return in about 2 weeks (around 1/4/2021) for as scheduled.    Follow-up and Disposition History           Plan was reviewed with patient, who verbalized agreement and understanding of the plan

## 2021-01-06 ENCOUNTER — OFFICE VISIT (OUTPATIENT)
Dept: ORTHOPEDIC SURGERY | Age: 61
End: 2021-01-06
Payer: COMMERCIAL

## 2021-01-06 VITALS
WEIGHT: 165.2 LBS | DIASTOLIC BLOOD PRESSURE: 103 MMHG | BODY MASS INDEX: 28.2 KG/M2 | TEMPERATURE: 96.7 F | SYSTOLIC BLOOD PRESSURE: 159 MMHG | RESPIRATION RATE: 16 BRPM | HEART RATE: 94 BPM | OXYGEN SATURATION: 97 % | HEIGHT: 64 IN

## 2021-01-06 DIAGNOSIS — G90.512 COMPLEX REGIONAL PAIN SYNDROME TYPE 1 OF LEFT UPPER EXTREMITY: Primary | ICD-10-CM

## 2021-01-06 DIAGNOSIS — M67.442 DIGITAL MUCOUS CYST OF FINGER OF LEFT HAND: ICD-10-CM

## 2021-01-06 DIAGNOSIS — T81.49XA WOUND INFECTION AFTER SURGERY: ICD-10-CM

## 2021-01-06 PROCEDURE — 99024 POSTOP FOLLOW-UP VISIT: CPT | Performed by: ORTHOPAEDIC SURGERY

## 2021-01-06 NOTE — PROGRESS NOTES
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  Worker's Compensation and legal considerations: none filed. Vitals:    01/06/21 0939   BP: (!) 159/103   Pulse: 94   Resp: 16   Temp: (!) 96.7 °F (35.9 °C)   TempSrc: Temporal   SpO2: 97%   Weight: 165 lb 3.2 oz (74.9 kg)   Height: 5' 4\" (1.626 m)   PainSc:   5   PainLoc: Hand           Chief Complaint   Patient presents with    Hand Pain     left middle finger pain       HPI: Patient presents today 6 weeks status post left middle finger mucous cyst excision and left middle finger A1 pulley release. The A1 pulley site is healed. There is continued erythema about the mucous cyst site however the incision is healed. Sensation is intact distally and cap refill is brisk. Range of motion is full. 4wk HPI: Patient presents today approximately 4 weeks status post left middle finger mucous cyst excision and left middle finger A1 pulley release. The A1 pulley site appears to be doing fine but she is continuing to have erythema and some tenderness about the mucous cyst surgical site. Initial HPI: Patient comes in today with complaints of bilateral middle finger pain and locking as well as bumps on her right index and left middle fingers. She reports draining the left middle finger mucous cyst.    Date of onset: Chronic    Injury: No    Prior Treatment:  Yes: Comment: Left middle finger A1 pulley release and left middle finger mucous cyst excision as well as antibiotics for postoperative infection. x    Numbness/ Tingling: No      ROS: Review of Systems - General ROS: negative  Psychological ROS: negative  ENT ROS: negative  Allergy and Immunology ROS: negative  Hematological and Lymphatic ROS: negative  Respiratory ROS: no cough, shortness of breath, or wheezing  Cardiovascular ROS: no chest pain or dyspnea on exertion  Gastrointestinal ROS: no abdominal pain, change in bowel habits, or black or bloody stools  Musculoskeletal ROS: positive for - pain in hand - bilateral  Neurological ROS: negative  Dermatological ROS: negative    Past Medical History:   Diagnosis Date    DM (diabetes mellitus) ([address]     Gastroparesis     Hypertension     Kidney stones     microscopic    Lymphocytic colitis     Peripheral neuropathy        Past Surgical History:   Procedure Laterality Date    HX HYSTERECTOMY      HX ORTHOPAEDIC      BROKEN Collar bone repair        Current Outpatient Medications   Medication Sig Dispense Refill    cephALEXin (Keflex) 500 mg capsule Take 500 mg by mouth four (4) times daily.  gabapentin (NEURONTIN) 100 mg capsule Take 200 mg by mouth Every morning.  rosuvastatin (Crestor) 10 mg tablet Take 10 mg by mouth nightly.  empagliflozin-metFORMIN (Synjardy) 12.5-1,000 mg per tablet Take 1 Tab by mouth two (2) times daily (with meals).  valsartan (DIOVAN) 40 mg tablet Take 20 mg by mouth daily.  metoprolol succinate (TOPROL-XL) 50 mg XL tablet Take 25 mg by mouth daily.  simvastatin (ZOCOR) 40 mg tablet TAKE 1 TABLET BY MOUTH EVERY DAY      fenofibric acid (TRILIPIX ER) 45 mg capsule Take  by mouth daily.  rosuvastatin (Crestor) 10 mg tablet Take 10 mg by mouth nightly.  colesevelam (WELCHOL) 625 mg tablet Take 2,000 mg by mouth.  DULoxetine (CYMBALTA) 60 mg capsule Take 60 mg by mouth two (2) times a day.  pantoprazole (PROTONIX) 40 mg tablet Take 40 mg by mouth two (2) times a day.  insulin lispro protamine/insulin lispro (HumaLOG Mix 50-50 Insuln U-100) 100 unit/mL (50-50) injection by SubCUTAneous route. Allergies   Allergen Reactions    Ace Inhibitors Cough     wheezig      Reglan [Metoclopramide Hcl] Other (comments)     Behavior problem    Sulfa (Sulfonamide Antibiotics) Hives           PE:     Physical Exam  Vitals signs and nursing note reviewed. Constitutional:       General: She is not in acute distress. Appearance: Normal appearance.  She is not ill-appearing. Eyes:      Extraocular Movements: Extraocular movements intact. Pupils: Pupils are equal, round, and reactive to light. Neck:      Musculoskeletal: Normal range of motion and neck supple. Cardiovascular:      Pulses: Normal pulses. Pulmonary:      Effort: Pulmonary effort is normal. No respiratory distress. Abdominal:      General: Abdomen is flat. There is no distension. Musculoskeletal: Normal range of motion. General: Tenderness present. No swelling, deformity or signs of injury. Right lower leg: No edema. Left lower leg: No edema. Skin:     General: Skin is warm and dry. Capillary Refill: Capillary refill takes less than 2 seconds. Findings: Erythema present. No bruising. Neurological:      General: No focal deficit present. Mental Status: She is alert and oriented to person, place, and time. Cranial Nerves: No cranial nerve deficit. Sensory: No sensory deficit. Psychiatric:         Mood and Affect: Mood normal.         Behavior: Behavior normal.            Left hand: A1 pulley incision healed. Wound about mucous cyst of middle finger healed however there is continued erythema. There is no warmth. There is hypersensitivity along the ulnar and radial borders. Imaging:     10/21/2020 plain films of left middle finger shows degenerative changes at the DIP joint. ICD-10-CM ICD-9-CM    1. Complex regional pain syndrome type 1 of left upper extremity  G90.512 337.21 REFERRAL TO OCCUPATIONAL THERAPY   2. Digital mucous cyst of finger of left hand  M67.442 727.43 REFERRAL TO OCCUPATIONAL THERAPY   3. Wound infection after surgery  T81.49XA 998.59 REFERRAL TO OCCUPATIONAL THERAPY         Plan:     OT for desensitization and CRPS protocol. Discussed massage with patient and range of motion exercises. Follow-up and Dispositions    · Return in about 2 months (around 3/6/2021) for Reevaluation and OT follow-up.           Plan was reviewed with patient, who verbalized agreement and understanding of the plan

## 2021-01-12 ENCOUNTER — HOSPITAL ENCOUNTER (OUTPATIENT)
Dept: PHYSICAL THERAPY | Age: 61
Discharge: HOME | End: 2021-01-12
Payer: COMMERCIAL

## 2021-01-12 PROCEDURE — 97165 OT EVAL LOW COMPLEX 30 MIN: CPT

## 2021-01-12 NOTE — PROGRESS NOTES
OCCUPATIONAL THERAPY - DAILY TREATMENT NOTE    Patient Name: German Brink        Date: 2021  : 1960   YES Patient  Verified  Visit #:   1   of   6  Insurance: Payor: LORAINE García / Plan: [facility] [employer] [facility] / Product Type: Commerical /      In time: 11:50 Out time: 12:30   Total Treatment Time: 40     TREATMENT AREA =  Left MF    SUBJECTIVE    Pain Level (on 0 to 10 scale):  4  / 10   Medication Changes/New allergies or changes in medical history, any new surgeries or procedures? NO    If yes, update Summary List   Subjective Functional Status/Changes:  []  No changes reported     I've been rubbing it and it feels a little better. OBJECTIVE     min Therapeutic Exercise:  [x]  See flow sheet      min Patient Education:  YES  Reviewed HEP- scar gel; finger sleeve; tendon glides    []  Progressed/Changed HEP based on: Other Objective/Functional Measures:    See eval for details      Post Treatment Pain Level (on 0 to 10) scale:   4  / 10     ASSESSMENT  Assessment/Changes in Function:     Patient presents with stiffness and pain left middle finger and should benefit from skilled OT to address deficits. OT Eval Complexity Justification:  Patient History: low- s/p surgery  Examination : low- AROM; pain  Clinical Decision Making: low- goal to stay Ind        [x]  See Progress Note/Recertification   Patient will continue to benefit from skilled OT services to address ROM deficits and pain  to attain remaining goals.    Progress toward goals / Updated goals:    See eval goals      PLAN  [x]  Upgrade activities as tolerated YES Continue plan of care   []  Discharge due to :    [x]  Other: OT for 4-6 visits for goals      Therapist: ZEE Melchor    Date: 2021 Time: 1:05 PM

## 2021-01-12 NOTE — PROGRESS NOTES
[facility] PHYSICAL THERAPY  [address] [facility] Pat Lopez, [address] [employer] - Phone: [phone]  Fax: [phone] / [phone]  [facility] THERAPY SERVICES  Patient Name: Godfrey Parks : 1960   Medical   Diagnosis: Finger pain, left [M79.645] Treatment Diagnosis: Left MF pain    Onset Date: 2020     Referral Source: Lo Reyes,  Start of Care [facility]): 2021   Prior Hospitalization: See medical history Provider #: [id number]   Prior Level of Function: Ind    Comorbidities: Diabetes    Medications: Verified on Patient Summary List   The Plan of Care and following information is based on the information from the initial evaluation.   ===========================================================================================  Assessment / key information: Patient is a 65 yo right handed female s/p left MF cyst removal DIP and trigger finger release with post op infection complications. AROM left elbow to hand is normal except MF MP 0-80; PIP 0-85; DIP 15-65. Left  41# right 47#. Left pinch 7-15# right 15-17#. Sensation intact to light touch but she reports history of neuropathy in hands due to diabetes. Pain 4/10 MF. Mild edema DIPJ at incision. Scar is healed and slightly thick in palm. MF tip remains slightly sensitive and irritated. She is Ind wit ADLs/IADLs and is working as a respiratory therapist.   FOTO score: na  ===========================================================================================  Eval Complexity: History: LOW Complexity : Brief history review ; Examination: LOW Complexity : 1-3 performance deficits relating to physical, cognitive , or psychosocial skils that result in activity limitations and / or participation restrictions ;  Decision Making:LOW Complexity : No comorbidities that affect functional and no verbal or physical assistance needed to complete eval tasks   Problem List: Pain effecting function, Decreased range of motion and Decreased flexibility/joint mobility   Treatment Plan may include any combination of the following: Therapeutic exercise, Therapeutic activities, Physical agent/modality, Scar management, Patient education and ADLs/IADLs  Patient / Family readiness to learn indicated by: asking questions, trying to perform skills and interest  Persons(s) to be included in education: patient (P)  Barriers to Learning/Limitations: None  Measures taken: na   Patient Goal (s): Decrease swelling, improve usage and gain strength. Patient self reported health status: good  Rehabilitation Potential: good   Short Term Goals: To be accomplished in 4-6 treatments: 1. Ind HEP  2.Ind scar massage/desensitization to decrease pain   3. Decrease pain 2/10 with ADLs  4. Full left MF flexion/extension to  and release      Frequency / Duration:   Patient to be seen 4-6    treatments:  Patient / Caregiver education and instruction: exercises    Therapist Signature: ZEE Conroy Date: 6/00/1703   Certification Period: na Time: 1:05 PM   ===========================================================================================  I certify that the above Occupational Therapy Services are being furnished while the patient is under my care. I agree with the treatment plan and certify that this therapy is necessary. Physician Signature:        Date:       Time:     Please sign and return to [facility] or you may fax the signed copy to [phone]. Thank you.

## 2021-02-23 NOTE — PROGRESS NOTES
[address] [facility] PHYSICAL THERAPY  [address] [facility] Cheryl Lopez, [address] - Phone: [phone]  Fax: [phone]  [address] [place] [address] THERAPY          Patient Name: Priscilla Knight : 1960   Medical/Treatment Diagnosis: Finger pain, left [M79.645]   Onset Date: 2020    Referral Source: Linus Fairbanks DO Start of Care [facility]): 21   Prior Hospitalization: See Medical History Provider #: [id number]   Prior Level of Function: Ind   Comorbidities: Diabetes    Medications: Verified on Patient Summary List   Visits from [place] campus: 1 Missed Visits: 1 cx       Patient was seen for an evaluation only and did not return for treatment or return messages to schedule. Assessments/Recommendations: Discontinue therapy due to lack of attendance or compliance. If you have any questions/comments please contact us directly at [phone]. Thank you for allowing us to assist in the care of your patient. Therapist Signature: ZEE Han Date: 21   Reporting Period: na Time: 12:40 PM       NOTE TO PHYSICIAN:  PLEASE COMPLETE THE ORDERS BELOW AND FAX TO   [facility] [place] Physical Therapy: [phone]. If you are unable to process this request in 24 hours please contact our office: [phone].    ___ I have read the above report and request that my patient be discharged from therapy.      Physician Signature:        Date:       Time:                                       Linus Fairbanks DO

## 2021-10-12 ENCOUNTER — APPOINTMENT (OUTPATIENT)
Dept: PHYSICAL THERAPY | Age: 61
End: 2021-10-12

## 2021-10-28 ENCOUNTER — APPOINTMENT (OUTPATIENT)
Dept: PHYSICAL THERAPY | Age: 61
End: 2021-10-28

## 2021-11-03 NOTE — PROGRESS NOTES
PHYSICAL THERAPY - DAILY TREATMENT NOTE    Patient Name: David Postin        Date: 2021  : 1960   Yes Patient  Verified  Visit #:   1   of   10  Insurance: Payor: Mayra Weston / Plan: [address] [place] [address] / Product Type: PPO /      In time:  Out time: 1:16   Total Treatment Time: 37     Medicare/[facility] Time Tracking (below)   Total Timed Codes (min):  9 1:1 Treatment Time:  9     TREATMENT AREA =  Neck pain [M54.2]    SUBJECTIVE  Pain Level (on 0 to 10 scale):  4  / 10   Medication Changes/New allergies or changes in medical history, any new surgeries or procedures?    no  If yes, update Summary List   Subjective Functional Status/Changes:  []  No changes reported     See POC         OBJECTIVE  Modalities Rationale:     9 min Therapeutic Exercise:  [x]  See flow sheet   Rationale:      increase ROM and increase strength to improve the patients ability to perform ADLs    Billed With/As:   [x] TE   [] TA   [] Neuro   [] Self Care Patient Education: [x] Review HEP    [x] Progressed/Changed HEP based on:   [x] positioning   [x] body mechanics   [] transfers   [] heat/ice application    [] other:      Other Objective/Functional Measures:    See POC     Post Treatment Pain Level (on 0 to 10) scale:   4  / 10     ASSESSMENT  Assessment/Changes in Function:     See POC     []  See Progress Note/Recertification   Patient will continue to benefit from skilled PT services to modify and progress therapeutic interventions, address functional mobility deficits, address ROM deficits, address strength deficits, analyze and address soft tissue restrictions, analyze and cue movement patterns, analyze and modify body mechanics/ergonomics, assess and modify postural abnormalities and instruct in home and community integration to attain remaining goals.    Progress toward goals / Updated goals:    See newly established goals in POC     PLAN  [x]  Upgrade activities as tolerated yes Continue plan of care []  Discharge due to :    []  Other:      Therapist: Malcom Worthington    Date: 11/4/2021 Time: 6:46 PM     Future Appointments   Date Time Provider Arpan Fu   11/4/2021 12:30 PM Rasheed Joshi
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
## 2021-11-04 ENCOUNTER — HOSPITAL ENCOUNTER (OUTPATIENT)
Dept: PHYSICAL THERAPY | Age: 61
Discharge: HOME | End: 2021-11-04
Payer: COMMERCIAL

## 2021-11-04 PROCEDURE — 97162 PT EVAL MOD COMPLEX 30 MIN: CPT

## 2021-11-04 PROCEDURE — 97110 THERAPEUTIC EXERCISES: CPT

## 2021-11-08 NOTE — PROGRESS NOTES
[address] [place] [address] [address], [address] [place] [address] - Phone: [phone]  Fax: [phone]  DISCHARGE SUMMARY  Patient Name: Charlie Campo : 1960   Treatment/Medical Diagnosis: Neck pain [M54.2]   Referral Source: Martha Harrison MD     Date of Initial Visit: 2021 Attended Visits: 1 Missed Visits: 0     SUMMARY OF TREATMENT  Patient has attended 1 PT session, including an initial evaluation, for neck pain. PT has included therapeutic exercises, patient education, body mechanics, posture modification, and home exercise program to improve posture, neck ROM/flexibility, thoracic mobility, neck, and parascapular strength as well as decrease pain. CURRENT STATUS  Patient attended only initial evaluation and then did not return secondary to busy schedule that does not allow time for participation in PT services. Therefore, a formal re-assessment of goals was not performed. Goal/Measure of Progress Goal Met? 1.  Establish HEP. Status at last Eval: Goal Established Current Status: HEP Established yes   2. Patient will report decreased c/o pain to < or = 7/10 at the wost to facilitate performance of hobbies (painting) with manageable sx in the neck    Status at last Eval: 10/10 at the worst Current Status: Unable to be assessed no     RECOMMENDATIONS  Other: Self-Discharge  If you have any questions/comments please contact us directly at [phone]. Thank you for allowing us to assist in the care of your patient.     Therapist Signature: Alessia Narvaez Date: 2021      Time: 6:20 PM

## 2021-11-09 ENCOUNTER — APPOINTMENT (OUTPATIENT)
Dept: PHYSICAL THERAPY | Age: 61
End: 2021-11-09
Payer: COMMERCIAL

## 2021-11-15 ENCOUNTER — APPOINTMENT (OUTPATIENT)
Dept: PHYSICAL THERAPY | Age: 61
End: 2021-11-15
Payer: COMMERCIAL

## 2021-12-01 ENCOUNTER — APPOINTMENT (OUTPATIENT)
Dept: PHYSICAL THERAPY | Age: 61
End: 2021-12-01

## (undated) DEVICE — BANDAGE,GAUZE,CONFORMING,1"X75",STRL,LF: Brand: MEDLINE

## (undated) DEVICE — BIPOLAR FORCEPS CORD: Brand: VALLEYLAB

## (undated) DEVICE — SOLUTION IV 1000ML 0.9% SOD CHL

## (undated) DEVICE — DRESSING,GAUZE,XEROFORM,CURAD,1"X8",ST: Brand: CURAD

## (undated) DEVICE — SYR LR LCK 1ML GRAD NSAF 30ML --

## (undated) DEVICE — PACK PROCEDURE SURG MAJ W/ BASIN LF

## (undated) DEVICE — GAUZE,SPONGE,4"X4",16PLY,STRL,LF,10/TRAY: Brand: MEDLINE

## (undated) DEVICE — DRAPE,HAND,STERILE: Brand: MEDLINE

## (undated) DEVICE — BLADE OPHTH MINI BEAV SHRP --

## (undated) DEVICE — SUTURE VCRL RAPIDE SZ 4-0 L18IN ABSRB UD L19MM PS-2 1/2 CIR VR496

## (undated) DEVICE — STRETCH BANDAGE ROLL: Brand: DERMACEA

## (undated) DEVICE — CANNULA PERF L2IN BLNT TIP 2MM VES CLR RADPQ BODY FEM LUER

## (undated) DEVICE — GARMENT,MEDLINE,DVT,SEQUENTIAL,CALF,MD: Brand: MEDLINE

## (undated) DEVICE — PREP SKN CHLRAPRP 26ML TNT -- CONVERT TO ITEM 373320

## (undated) DEVICE — INTENDED FOR TISSUE SEPARATION, AND OTHER PROCEDURES THAT REQUIRE A SHARP SURGICAL BLADE TO PUNCTURE OR CUT.: Brand: BARD-PARKER ®  SAFETY SCALPED

## (undated) DEVICE — SUTURE CHROMIC GUT SZ 5-0 L18IN ABSRB BRN L16MM PS-3 3/8 1636G

## (undated) DEVICE — CURITY STRETCH BANDAGE: Brand: CURITY

## (undated) DEVICE — STERILE POLYISOPRENE POWDER-FREE SURGICAL GLOVES: Brand: PROTEXIS

## (undated) DEVICE — ZIMMER® STERILE DISPOSABLE TOURNIQUET CUFF WITH PROTECTIVE SLEEVE AND PLC, DUAL PORT, SINGLE BLADDER, 18 IN. (46 CM)

## (undated) DEVICE — BLANKET WRM AD W50XL85.8IN PACU FULL BODY FORC AIR

## (undated) DEVICE — BNDG ELAS HK LOOP 3X5YD NS -- MATRIX

## (undated) DEVICE — SUT CHRMC 4-0 27IN SH BRN --

## (undated) DEVICE — THREE-QUARTER SHEET: Brand: CONVERTORS

## (undated) DEVICE — KIT CLN UP BON SECOURS MARYV

## (undated) DEVICE — BANDAGE COMPR 9 FTX4 IN SMOOTH COMFORTABLE SYNTH ESMRK LF

## (undated) DEVICE — GOWN,REINFORCE,POLY,SIRUS,SETSLV,XLARGE: Brand: MEDLINE